# Patient Record
Sex: MALE | Race: WHITE | NOT HISPANIC OR LATINO | Employment: FULL TIME | URBAN - METROPOLITAN AREA
[De-identification: names, ages, dates, MRNs, and addresses within clinical notes are randomized per-mention and may not be internally consistent; named-entity substitution may affect disease eponyms.]

---

## 2017-11-13 ENCOUNTER — OFFICE VISIT (OUTPATIENT)
Dept: FAMILY MEDICINE | Facility: CLINIC | Age: 27
End: 2017-11-13
Payer: COMMERCIAL

## 2017-11-13 VITALS
RESPIRATION RATE: 16 BRPM | BODY MASS INDEX: 36.7 KG/M2 | SYSTOLIC BLOOD PRESSURE: 133 MMHG | OXYGEN SATURATION: 97 % | HEART RATE: 101 BPM | TEMPERATURE: 98.4 F | WEIGHT: 282 LBS | DIASTOLIC BLOOD PRESSURE: 88 MMHG

## 2017-11-13 DIAGNOSIS — R07.0 THROAT PAIN: Primary | ICD-10-CM

## 2017-11-13 LAB
DEPRECATED S PYO AG THROAT QL EIA: NORMAL
SPECIMEN SOURCE: NORMAL

## 2017-11-13 PROCEDURE — 87081 CULTURE SCREEN ONLY: CPT | Performed by: NURSE PRACTITIONER

## 2017-11-13 PROCEDURE — 87880 STREP A ASSAY W/OPTIC: CPT | Performed by: NURSE PRACTITIONER

## 2017-11-13 PROCEDURE — 99213 OFFICE O/P EST LOW 20 MIN: CPT | Performed by: NURSE PRACTITIONER

## 2017-11-13 NOTE — NURSING NOTE
"Chief Complaint   Patient presents with     Pharyngitis     sore throat and cough x 3 days       Initial /88  Pulse 101  Temp 98.4  F (36.9  C) (Oral)  Resp 16  Wt 282 lb (127.9 kg)  SpO2 97%  BMI 36.7 kg/m2 Estimated body mass index is 36.7 kg/(m^2) as calculated from the following:    Height as of 11/26/14: 6' 1.5\" (1.867 m).    Weight as of this encounter: 282 lb (127.9 kg).  Medication Reconciliation: complete   Falguni Grewal MA      "

## 2017-11-13 NOTE — PROGRESS NOTES
SUBJECTIVE:   Lalo Hill is a 27 year old male presenting with a chief complaint of cough, congestion, runny nose, throat.   Onset of symptoms was 3 day(s) ago.  Course of illness is worsening.    Severity moderate  Treatment measures tried include Tylenol/Ibuprofen, Fluids and Rest.  Predisposing factors include None.    Past Medical History:   Diagnosis Date     * * * SBE PROPHYLAXIS * * *     Bicuspid aortic valve/needs prophylaxis for dental procedures     Current Outpatient Prescriptions   Medication Sig Dispense Refill     naproxen (NAPROSYN) 500 MG tablet Take 1 tablet (500 mg) by mouth 2 times daily (with meals) 60 tablet 1     NO ACTIVE MEDICATIONS .       Social History   Substance Use Topics     Smoking status: Never Smoker     Smokeless tobacco: Not on file     Alcohol use No       ROS:  CONSTITUTIONAL:NEGATIVE for fever, chills, change in weight  INTEGUMENTARY/SKIN: NEGATIVE for worrisome rashes, moles or lesions  EYES: NEGATIVE for vision changes or irritation  ENT/MOUTH: POSITIVE for rhinorrhea-clear and sore throat  RESP: POSITIVE for cough  CV: NEGATIVE for chest pain, palpitations or peripheral edema  GI: NEGATIVE for nausea, abdominal pain, heartburn, or change in bowel habits  MUSCULOSKELETAL: NEGATIVE for significant arthralgias or myalgia  NEURO: NEGATIVE for weakness, dizziness or paresthesias  ENDOCRINE: NEGATIVE for temperature intolerance, skin/hair changes  HEME/ALLERGY/IMMUNE: NEGATIVE for bleeding problems  PSYCHIATRIC: NEGATIVE for changes in mood or affect    OBJECTIVE:  /88  Pulse 101  Temp 98.4  F (36.9  C) (Oral)  Resp 16  Wt 282 lb (127.9 kg)  SpO2 97%  BMI 36.7 kg/m2  GENERAL APPEARANCE: Alert and no distress  EYES: EOMI,  PERRL, conjunctiva clear  HENT: TM's normal bilaterally, nose and mouth without erythema, ulcers or lesions, tonsillar hypertrophy, tonsillar erythema and tonsillar exudate  NECK: supple, nontender, no lymphadenopathy  RESP: lungs clear to  auscultation - no rales, rhonchi or wheezes  CV: regular rates and rhythm, normal S1 S2, no murmur noted  SKIN: no suspicious lesions or rashes    Rapid strep negative, culture pending will notify    ASSESSMENT:  (R07.0) Throat pain  (primary encounter diagnosis)  Probable viral.     PLAN:  Ibuprofen, Saline gargles and Vaporizer, increased rest and fluids  Monitor symptoms, call or rtc if worsening or not improving.       OSMANY Kirkland CNP

## 2017-11-14 LAB
BACTERIA SPEC CULT: NORMAL
SPECIMEN SOURCE: NORMAL

## 2017-11-14 NOTE — PROGRESS NOTES
"  SUBJECTIVE:   CC: Lalo Hill is an 27 year old male who presents for preventative health visit.     Healthy Habits:    Do you get at least three servings of calcium containing foods daily (dairy, green leafy vegetables, etc.)? {YES/NO, DAIRY INTAKE:400346::\"yes\"}    Amount of exercise or daily activities, outside of work: {AMOUNT EXERCISE:603466}    Problems taking medications regularly {Yes /No default:862697::\"No\"}    Medication side effects: {Yes /No default.:165063::\"No\"}    Have you had an eye exam in the past two years? {YESNOBLANK:087512}    Do you see a dentist twice per year? {YESNOBLANK:824688}    Do you have sleep apnea, excessive snoring or daytime drowsiness?{YESNOBLANK:060490}    {Outside tests to abstract? :395562}    {additional problems to add (Optional):268316}    Today's PHQ-2 Score:   PHQ-2 ( 1999 Pfizer) 11/13/2017 5/25/2016   Q1: Little interest or pleasure in doing things 0 0   Q2: Feeling down, depressed or hopeless 0 -   PHQ-2 Score 0 -     {PHQ-2 LOOK IN ASSESSMENTS :361683}  Abuse: Current or Past(Physical, Sexual or Emotional)- {YES/NO/NA:936733}  Do you feel safe in your environment - {YES/NO/NA:273296}    Social History   Substance Use Topics     Smoking status: Never Smoker     Smokeless tobacco: Not on file     Alcohol use No     {ETOH AUDIT:110154}    Last PSA: No results found for: PSA    Reviewed orders with patient. Reviewed health maintenance and updated orders accordingly - {Yes/No:774634::\"Yes\"}  {Chronicprobdata (Optional):101439}    Reviewed and updated as needed this visit by clinical staff         Reviewed and updated as needed this visit by Provider        {HISTORY OPTIONS (Optional):438851}    ROS:  {MALE ROS-adult preventive care package:137973::\"C: NEGATIVE for fever, chills, change in weight\",\"I: NEGATIVE for worrisome rashes, moles or lesions\",\"E: NEGATIVE for vision changes or irritation\",\"ENT: NEGATIVE for ear, mouth and throat problems\",\"R: NEGATIVE for " "significant cough or SOB\",\"CV: NEGATIVE for chest pain, palpitations or peripheral edema\",\"GI: NEGATIVE for nausea, abdominal pain, heartburn, or change in bowel habits\",\" male: negative for dysuria, hematuria, decreased urinary stream, erectile dysfunction, urethral discharge\",\"M: NEGATIVE for significant arthralgias or myalgia\",\"N: NEGATIVE for weakness, dizziness or paresthesias\",\"P: NEGATIVE for changes in mood or affect\"}    OBJECTIVE:   There were no vitals taken for this visit.  EXAM:  {Exam Choices:727551}    ASSESSMENT/PLAN:   {Diag Picklist:443048}    COUNSELING:  {MALE COUNSELING MESSAGES:761595::\"Reviewed preventive health counseling, as reflected in patient instructions\"}    {BP Counseling- Complete if BP >= 120/80  (Optional):010875}     reports that he has never smoked. He does not have any smokeless tobacco history on file.  {Tobacco Cessation -- Complete if patient is a smoker (Optional):666248}  Estimated body mass index is 36.7 kg/(m^2) as calculated from the following:    Height as of 11/26/14: 6' 1.5\" (1.867 m).    Weight as of 11/13/17: 282 lb (127.9 kg).   {Weight Management Plan (ACO) Complete if BMI is abnormal-  Ages 18-64  BMI >24.9.  Age 65+ with BMI <23 or >30 (Optional):943601}    Counseling Resources:  ATP IV Guidelines  Pooled Cohorts Equation Calculator  FRAX Risk Assessment  ICSI Preventive Guidelines  Dietary Guidelines for Americans, 2010  USDA's MyPlate  ASA Prophylaxis  Lung CA Screening    Aaron Ramos PA-C  [facility]  "

## 2017-11-15 ENCOUNTER — OFFICE VISIT (OUTPATIENT)
Dept: FAMILY MEDICINE | Facility: CLINIC | Age: 27
End: 2017-11-15
Payer: COMMERCIAL

## 2017-11-15 VITALS
HEART RATE: 93 BPM | OXYGEN SATURATION: 96 % | BODY MASS INDEX: 35.94 KG/M2 | WEIGHT: 280 LBS | SYSTOLIC BLOOD PRESSURE: 138 MMHG | DIASTOLIC BLOOD PRESSURE: 85 MMHG | HEIGHT: 74 IN

## 2017-11-15 DIAGNOSIS — R03.0 ELEVATED BLOOD PRESSURE READING WITHOUT DIAGNOSIS OF HYPERTENSION: ICD-10-CM

## 2017-11-15 DIAGNOSIS — R51.9 NONINTRACTABLE HEADACHE, UNSPECIFIED CHRONICITY PATTERN, UNSPECIFIED HEADACHE TYPE: ICD-10-CM

## 2017-11-15 DIAGNOSIS — N50.89 NODULE OF TESTIS: ICD-10-CM

## 2017-11-15 DIAGNOSIS — Z23 NEED FOR PROPHYLACTIC VACCINATION AND INOCULATION AGAINST INFLUENZA: ICD-10-CM

## 2017-11-15 DIAGNOSIS — Q23.81 BICUSPID AORTIC VALVE: ICD-10-CM

## 2017-11-15 DIAGNOSIS — Z00.00 ROUTINE GENERAL MEDICAL EXAMINATION AT A HEALTH CARE FACILITY: Primary | ICD-10-CM

## 2017-11-15 LAB
ANION GAP SERPL CALCULATED.3IONS-SCNC: 8 MMOL/L (ref 3–14)
BUN SERPL-MCNC: 10 MG/DL (ref 7–30)
CALCIUM SERPL-MCNC: 9 MG/DL (ref 8.5–10.1)
CHLORIDE SERPL-SCNC: 106 MMOL/L (ref 94–109)
CHOLEST SERPL-MCNC: 165 MG/DL
CO2 SERPL-SCNC: 25 MMOL/L (ref 20–32)
CREAT SERPL-MCNC: 0.89 MG/DL (ref 0.66–1.25)
GFR SERPL CREATININE-BSD FRML MDRD: >90 ML/MIN/1.7M2
GLUCOSE SERPL-MCNC: 92 MG/DL (ref 70–99)
HDLC SERPL-MCNC: 46 MG/DL
LDLC SERPL CALC-MCNC: 101 MG/DL
NONHDLC SERPL-MCNC: 119 MG/DL
POTASSIUM SERPL-SCNC: 3.8 MMOL/L (ref 3.4–5.3)
SODIUM SERPL-SCNC: 139 MMOL/L (ref 133–144)
TRIGL SERPL-MCNC: 88 MG/DL

## 2017-11-15 PROCEDURE — 99213 OFFICE O/P EST LOW 20 MIN: CPT | Mod: 25 | Performed by: PHYSICIAN ASSISTANT

## 2017-11-15 PROCEDURE — 90471 IMMUNIZATION ADMIN: CPT | Performed by: PHYSICIAN ASSISTANT

## 2017-11-15 PROCEDURE — 99395 PREV VISIT EST AGE 18-39: CPT | Mod: 25 | Performed by: PHYSICIAN ASSISTANT

## 2017-11-15 PROCEDURE — 80061 LIPID PANEL: CPT | Performed by: PHYSICIAN ASSISTANT

## 2017-11-15 PROCEDURE — 90686 IIV4 VACC NO PRSV 0.5 ML IM: CPT | Performed by: PHYSICIAN ASSISTANT

## 2017-11-15 PROCEDURE — 36415 COLL VENOUS BLD VENIPUNCTURE: CPT | Performed by: PHYSICIAN ASSISTANT

## 2017-11-15 PROCEDURE — 80048 BASIC METABOLIC PNL TOTAL CA: CPT | Performed by: PHYSICIAN ASSISTANT

## 2017-11-15 NOTE — PROGRESS NOTES

## 2017-11-15 NOTE — PROGRESS NOTES
SUBJECTIVE:   CC: Lalo Hill is an 27 year old male who presents for preventative health visit.     Physical   Annual:     Getting at least 3 servings of Calcium per day::  Yes    Bi-annual eye exam::  Yes    Dental care twice a year::  Yes    Sleep apnea or symptoms of sleep apnea::  None    Diet::  Regular (no restrictions)    Frequency of exercise::  2-3 days/week    Duration of exercise::  15-30 minutes    Taking medications regularly::  Yes    Medication side effects::  None    Additional concerns today::  YES      Lump left testicle x 2-3 months, sensitive to the touch     History of bicuspid aortic valve. When he was younger was having chest pain and was discovered to have a bicuspid aortic valve. No current symptoms.   Last cardiac follow up  Was 19 and was to follow up age 26.     headache in the past. Tx: naproxen.   Did get them every week. And now improved in the last couple months.        Today's PHQ-2 Score:   PHQ-2 ( 1999 Pfizer) 11/15/2017   Q1: Little interest or pleasure in doing things 0   Q2: Feeling down, depressed or hopeless 0   PHQ-2 Score 0   Q1: Little interest or pleasure in doing things Not at all   Q2: Feeling down, depressed or hopeless Not at all   PHQ-2 Score 0       Abuse: Current or Past(Physical, Sexual or Emotional)- No  Do you feel safe in your environment - Yes    Social History   Substance Use Topics     Smoking status: Never Smoker     Smokeless tobacco: Never Used     Alcohol use 1.2 oz/week     2 Cans of beer per week     The patient does not drink >3 drinks per day nor >7 drinks per week.    Last PSA: No results found for: PSA    Reviewed orders with patient. Reviewed health maintenance and updated orders accordingly - Yes  Labs reviewed in EPIC  BP Readings from Last 3 Encounters:   11/15/17 138/85   11/13/17 133/88   05/25/16 124/76    Wt Readings from Last 3 Encounters:   11/15/17 280 lb (127 kg)   11/13/17 282 lb (127.9 kg)   05/25/16 278 lb (126.1 kg)                   Patient Active Problem List   Diagnosis     CARDIOVASCULAR SCREENING; LDL GOAL LESS THAN 160     Drusen (degenerative) of retina     Elevated blood pressure reading without diagnosis of hypertension     Bicuspid aortic valve     Nonintractable headache, unspecified chronicity pattern, unspecified headache type     BMI 36.0-36.9,adult     History reviewed. No pertinent surgical history.    Social History   Substance Use Topics     Smoking status: Never Smoker     Smokeless tobacco: Never Used     Alcohol use 1.2 oz/week     2 Cans of beer per week     Family History   Problem Relation Age of Onset     Hypertension Father      CANCER Maternal Grandmother      C.A.D. Maternal Grandfather      CEREBROVASCULAR DISEASE Maternal Grandfather      Prostate Cancer Maternal Grandfather      C.A.D. Paternal Grandmother      Blood Disease Paternal Grandmother      C.A.D. Paternal Grandfather      CEREBROVASCULAR DISEASE Paternal Grandfather      Hypertension Paternal Grandfather      Prostate Cancer Paternal Grandfather          Current Outpatient Prescriptions   Medication Sig Dispense Refill     NO ACTIVE MEDICATIONS .       naproxen (NAPROSYN) 500 MG tablet Take 1 tablet (500 mg) by mouth 2 times daily (with meals) (Patient not taking: Reported on 11/15/2017) 60 tablet 1     Allergies   Allergen Reactions     Erythro [Erythromycin] Rash           Reviewed and updated as needed this visit by clinical staffTobacco  Allergies  Meds  Problems  Med Hx  Surg Hx  Fam Hx  Soc Hx          Reviewed and updated as needed this visit by Provider  Allergies  Meds  Problems          Past Medical History:   Diagnosis Date     * * * SBE PROPHYLAXIS * * *     Bicuspid aortic valve/needs prophylaxis for dental procedures      History reviewed. No pertinent surgical history.    Review of Systems  C: NEGATIVE for fever, chills, change in weight  I: NEGATIVE for worrisome rashes, moles or lesions  E: NEGATIVE for vision changes or  "irritation  ENT: NEGATIVE for ear, mouth and throat problems  R: NEGATIVE for significant cough or SOB  CV: NEGATIVE for chest pain, palpitations or peripheral edema  GI: NEGATIVE for nausea, abdominal pain, heartburn, or change in bowel habits   male: negative for dysuria, hematuria, decreased urinary stream, erectile dysfunction, urethral discharge  M: NEGATIVE for significant arthralgias or myalgia  N: NEGATIVE for weakness, dizziness or paresthesias  P: NEGATIVE for changes in mood or affect    OBJECTIVE:   /85  Pulse 93  Ht 6' 1.5\" (1.867 m)  Wt 280 lb (127 kg)  SpO2 96%  BMI 36.44 kg/m2    Physical Exam  GENERAL: healthy, alert and no distress  EYES: Eyes grossly normal to inspection, PERRL and conjunctivae and sclerae normal  HENT: ear canals and TM's normal, nose and mouth without ulcers or lesions  NECK: no adenopathy, no asymmetry, masses, or scars and thyroid normal to palpation  RESP: lungs clear to auscultation - no rales, rhonchi or wheezes  CV: regular rate and rhythm, normal S1 S2, no S3 or S4, no murmur, click or rub, no peripheral edema and peripheral pulses strong  ABDOMEN: soft, nontender, no hepatosplenomegaly, no masses and bowel sounds normal   (male): normal male genitalia without lesions or urethral discharge, no hernia  Small epididymal nodule on left.   MS: no gross musculoskeletal defects noted, no edema  SKIN: no suspicious lesions or rashes  NEURO: Normal strength and tone, mentation intact and speech normal  PSYCH: mentation appears normal, affect normal/bright    ASSESSMENT/PLAN:       ICD-10-CM    1. Routine general medical examination at a health care facility Z00.00 Lipid panel reflex to direct LDL Fasting     Basic metabolic panel   2. Bicuspid aortic valve Q23.1 CARDIOLOGY EVAL ADULT REFERRAL     OFFICE/OUTPT VISIT,EST,LEVL III   3. Nonintractable headache, unspecified chronicity pattern, unspecified headache type R51 OFFICE/OUTPT VISIT,EST,LEVL III   4. Elevated " "blood pressure reading without diagnosis of hypertension R03.0 OFFICE/OUTPT VISIT,EST,LEVL III   5. BMI 36.0-36.9,adult Z68.36    6. Nodule of testis N50.89 US Testicular and Scrotum     OFFICE/OUTPT VISIT,EST,LEVL III   7. Need for prophylactic vaccination and inoculation against influenza Z23 FLU VAC, SPLIT VIRUS IM > 3 YO (QUADRIVALENT) [99660]     Vaccine Administration, Initial [55104]   1. Work on Healthy diet and exercise. Getting heart rate elevated for 30 mins most days of week.  2. Follow up  With cardiology  3. Stable con't naproxen as needed  4-5. Work on Healthy diet and exercise. Getting heart rate elevated for 30 mins most days of week.  6. Will get [place] Follow up  Dependant on results.     COUNSELING:   Reviewed preventive health counseling, as reflected in patient instructions       Regular exercise       Healthy diet/nutrition    BP Screening:   Last 3 BP Readings:    BP Readings from Last 3 Encounters:   11/15/17 138/85   11/13/17 133/88   05/25/16 124/76       The following was recommended to the patient:  Re-screen BP within a year and recommended lifestyle modifications       reports that he has never smoked. He has never used smokeless tobacco.      Estimated body mass index is 36.44 kg/(m^2) as calculated from the following:    Height as of this encounter: 6' 1.5\" (1.867 m).    Weight as of this encounter: 280 lb (127 kg).   Weight management plan: Discussed healthy diet and exercise guidelines and patient will follow up in 12 months in clinic to re-evaluate.    Counseling Resources:  ATP IV Guidelines  Pooled Cohorts Equation Calculator  FRAX Risk Assessment  ICSI Preventive Guidelines  Dietary Guidelines for Americans, 2010  USDA's MyPlate  ASA Prophylaxis  Lung CA Screening    Aaron Ramos PA-C  [facility]  "

## 2017-11-15 NOTE — LETTER
November 15, 2017    Lalo Hill  [address] [place] [address]          Mr. Hill,    All of your labs were normal for you.    Please contact the clinic if you have additional questions.  Thank you.    Sincerely,    Aaron Ramos PA-C     Results for orders placed or performed in visit on 11/15/17   Lipid panel reflex to direct LDL Fasting   Result Value Ref Range    Cholesterol 165 <200 mg/dL    Triglycerides 88 <150 mg/dL    HDL Cholesterol 46 >39 mg/dL    LDL Cholesterol Calculated 101 (H) <100 mg/dL    Non HDL Cholesterol 119 <130 mg/dL   Basic metabolic panel   Result Value Ref Range    Sodium 139 133 - 144 mmol/L    Potassium 3.8 3.4 - 5.3 mmol/L    Chloride 106 94 - 109 mmol/L    Carbon Dioxide 25 20 - 32 mmol/L    Anion Gap 8 3 - 14 mmol/L    Glucose 92 70 - 99 mg/dL    Urea Nitrogen 10 7 - 30 mg/dL    Creatinine 0.89 0.66 - 1.25 mg/dL    GFR Estimate >90 >60 mL/min/1.7m2    GFR Estimate If Black >90 >60 mL/min/1.7m2    Calcium 9.0 8.5 - 10.1 mg/dL

## 2017-11-15 NOTE — MR AVS SNAPSHOT
After Visit Summary   11/15/2017    Lalo Hill    MRN: [id number]           Patient Information     Date Of Birth          1990        Visit Information        Provider Department      11/15/2017 8:00 AM Aaron Ramos PA-C [facility]        Today's Diagnoses     Routine general medical examination at a health care facility    -  1    Bicuspid aortic valve        Nonintractable headache, unspecified chronicity pattern, unspecified headache type        Elevated blood pressure reading without diagnosis of hypertension        BMI 36.0-36.9,adult        Nodule of testis          Care Instructions      Preventive Health Recommendations  Male Ages 26 - 39    Yearly exam:             See your health care provider every year in order to  o   Review health changes.   o   Discuss preventive care.    o   Review your medicines if your doctor has prescribed any.    You should be tested each year for STDs (sexually transmitted diseases), if you re at risk.     After age 35, talk to your provider about cholesterol testing. If you are at risk for heart disease, have your cholesterol tested at least every 5 years.     If you are at risk for diabetes, you should have a diabetes test (fasting glucose).  Shots: Get a flu shot each year. Get a tetanus shot every 10 years.     Nutrition:    Eat at least 5 servings of fruits and vegetables daily.     Eat whole-grain bread, whole-wheat pasta and brown rice instead of white grains and rice.     Talk to your provider about Calcium and Vitamin D.     Lifestyle    Exercise for at least 150 minutes a week (30 minutes a day, 5 days a week). This will help you control your weight and prevent disease.     Limit alcohol to one drink per day.     No smoking.     Wear sunscreen to prevent skin cancer.     See your dentist every six months for an exam and cleaning.             Follow-ups after your visit        Future tests that were ordered for you today   "   Open Future Orders        Priority Expected Expires Ordered    US Testicular and Scrotum Routine  11/15/2018 11/15/2017            Who to contact     If you have questions or need follow up information about today's clinic visit or your schedule please contact [facility] directly at [phone].  Normal or non-critical lab and imaging results will be communicated to you by MyChart, letter or phone within 4 business days after the clinic has received the results. If you do not hear from us within 7 days, please contact the clinic through MyChart or phone. If you have a critical or abnormal lab result, we will notify you by phone as soon as possible.  Submit refill requests through [employer] or call your pharmacy and they will forward the refill request to us. Please allow 3 business days for your refill to be completed.          Additional Information About Your Visit        MyChart Information     [employer] lets you send messages to your doctor, view your test results, renew your prescriptions, schedule appointments and more. To sign up, go to [email or web address] . Click on \"Log in\" on the left side of the screen, which will take you to the Welcome page. Then click on \"Sign up Now\" on the right side of the page.     You will be asked to enter the access code listed below, as well as some personal information. Please follow the directions to create your username and password.     Your access code is: [email or web address]  Expires: 2018  1:42 PM     Your access code will  in 90 days. If you need help or a new code, please call your [facility] clinic or [phone].        Care EveryWhere ID     This is your Care EveryWhere ID. This could be used by other organizations to access your [facility] medical records  [id number]        Your Vitals Were     Pulse Height Pulse Oximetry BMI (Body Mass Index)          93 6' 1.5\" (1.867 m) 96% 36.44 kg/m2         Blood Pressure from Last 3 Encounters: "   11/15/17 138/85   11/13/17 133/88   05/25/16 124/76    Weight from Last 3 Encounters:   11/15/17 280 lb (127 kg)   11/13/17 282 lb (127.9 kg)   05/25/16 278 lb (126.1 kg)              We Performed the Following     Basic metabolic panel     Lipid panel reflex to direct LDL Fasting        Primary Care Provider Office Phone # Fax #    [facility] [phone]       No address on file        Equal Access to Services     EDUIN DOMÍNGUEZ : Hadii aad ku hadasho Soomaali, waaxda luqadaha, qaybta kaalmada adeegyada, salo vital . So [facility] [phone].    ATENCIÓN: Si habla español, tiene a shannon disposición servicios gratuitos de asistencia lingüística. Llame al [phone].    We comply with applicable federal civil rights laws and [place] laws. We do not discriminate on the basis of race, color, national origin, age, disability, sex, sexual orientation, or gender identity.            Thank you!     Thank you for choosing [facility]  for your care. Our goal is always to provide you with excellent care. Hearing back from our patients is one way we can continue to improve our services. Please take a few minutes to complete the written survey that you may receive in the mail after your visit with us. Thank you!             Your Updated Medication List - Protect others around you: Learn how to safely use, store and throw away your medicines at www.disposemymeds.org.          This list is accurate as of: 11/15/17  8:30 AM.  Always use your most recent med list.                   Brand Name Dispense Instructions for use Diagnosis    naproxen 500 MG tablet    NAPROSYN    60 tablet    Take 1 tablet (500 mg) by mouth 2 times daily (with meals)    Headache(784.0)       NO ACTIVE MEDICATIONS      .

## 2017-11-15 NOTE — NURSING NOTE
"Chief Complaint   Patient presents with     Physical       Initial /85  Pulse 93  Ht 6' 1.5\" (1.867 m)  Wt 280 lb (127 kg)  SpO2 96%  BMI 36.44 kg/m2 Estimated body mass index is 36.44 kg/(m^2) as calculated from the following:    Height as of this encounter: 6' 1.5\" (1.867 m).    Weight as of this encounter: 280 lb (127 kg).  Medication Reconciliation: complete  Yue Mathis CMA    "

## 2017-11-15 NOTE — PROGRESS NOTES
MrFela Hill,    All of your labs were normal for you.    Please contact the clinic if you have additional questions.  Thank you.    Sincerely,    Aaron Ramos PA-C

## 2017-11-16 ENCOUNTER — TELEPHONE (OUTPATIENT)
Dept: FAMILY MEDICINE | Facility: CLINIC | Age: 27
End: 2017-11-16

## 2017-11-16 ENCOUNTER — RADIANT APPOINTMENT (OUTPATIENT)
Dept: ULTRASOUND IMAGING | Facility: CLINIC | Age: 27
End: 2017-11-16
Attending: PHYSICIAN ASSISTANT
Payer: COMMERCIAL

## 2017-11-16 DIAGNOSIS — N50.89 NODULE OF TESTIS: ICD-10-CM

## 2017-11-16 PROCEDURE — 76870 US EXAM SCROTUM: CPT

## 2017-11-16 NOTE — TELEPHONE ENCOUNTER
Left message on voicemail for patient to call back.   Direct number given to call back: [phone].     Carmen Valentin RN

## 2017-11-16 NOTE — TELEPHONE ENCOUNTER
Notes Recorded by Aaron Ramos PA-C on 11/16/2017 at 4:23 PM  RN please call patient with normal results.

## 2019-04-22 ENCOUNTER — TRANSFERRED RECORDS (OUTPATIENT)
Dept: HEALTH INFORMATION MANAGEMENT | Facility: CLINIC | Age: 29
End: 2019-04-22

## 2019-04-22 ENCOUNTER — NURSE TRIAGE (OUTPATIENT)
Dept: NURSING | Facility: CLINIC | Age: 29
End: 2019-04-22

## 2019-04-22 NOTE — TELEPHONE ENCOUNTER
"    Reason for Disposition    [1] Pain in the scrotum or testicle AND [2] present > 1 hour    Additional Information    Negative: Shock suspected (e.g., cold/pale/clammy skin, too weak to stand, low BP, rapid pulse)    Negative: Difficult to awaken or acting confused  (e.g., disoriented, slurred speech)    Negative: Passed out (i.e., lost consciousness, collapsed and was not responding)    Negative: Sounds like a life-threatening emergency to the triager    Negative: Chest pain    Negative: Pain is mainly in upper abdomen  (if needed ask: \"is it mainly above the belly button?\")    Negative: Followed an abdomen (stomach) injury    Negative: [1] SEVERE pain (e.g., excruciating) AND [2] present > 1 hour     Pain at 5    Negative: [1] SEVERE pain AND [2] age > 60    Negative: [1] Vomiting AND [2] contains red blood or black (\"coffee ground\") material  (Exception: few red streaks in vomit that only happened once)    Negative: Blood in bowel movements  (Exception: Blood on surface of BM with constipation)    Negative: Black or tarry bowel movements  (Exception: chronic-unchanged  black-grey bowel movements AND is taking iron pills or Pepto-bismol)    Negative: [1] Unable to urinate (or only a few drops) > 4 hours AND     [2] bladder feels very full (e.g., palpable bladder or strong urge to urinate)    Protocols used: ABDOMINAL PAIN - MALE-ADULT-      "

## 2021-12-15 ENCOUNTER — E-VISIT (OUTPATIENT)
Dept: URGENT CARE | Facility: CLINIC | Age: 31
End: 2021-12-15
Payer: COMMERCIAL

## 2021-12-15 DIAGNOSIS — J01.90 ACUTE SINUSITIS, RECURRENCE NOT SPECIFIED, UNSPECIFIED LOCATION: ICD-10-CM

## 2021-12-15 DIAGNOSIS — Z20.822 SUSPECTED COVID-19 VIRUS INFECTION: ICD-10-CM

## 2021-12-15 PROCEDURE — 99421 OL DIG E/M SVC 5-10 MIN: CPT | Performed by: PHYSICIAN ASSISTANT

## 2021-12-15 RX ORDER — GUAIFENESIN 1200 MG/1
1200 TABLET, EXTENDED RELEASE ORAL 2 TIMES DAILY
Qty: 60 TABLET | Refills: 0 | Status: SHIPPED | OUTPATIENT
Start: 2021-12-15 | End: 2022-09-26

## 2021-12-15 RX ORDER — PSEUDOEPHEDRINE HCL 120 MG/1
120 TABLET, FILM COATED, EXTENDED RELEASE ORAL EVERY 12 HOURS
Qty: 15 TABLET | Refills: 0 | Status: SHIPPED | OUTPATIENT
Start: 2021-12-15 | End: 2022-09-26

## 2021-12-15 NOTE — PATIENT INSTRUCTIONS
Dear Lalo Hill,    Your symptoms show that you may have coronavirus (COVID-19). This illness can cause fever, cough and trouble breathing. Many people get a mild case and get better on their own. Some people can get very sick.  You may also just have a sinus infection. These are generally viral and should go away within 7-10 days. If symptoms are persisting or worsening follow up.     Will I be tested for COVID-19?  We would like to test you for Covid-19 virus. I have placed orders for this test.     To schedule: go to your [employer] home page and scroll down to the section that says  You have an appointment that needs to be scheduled  and click the large green button that says  Schedule Now  and follow the steps to find the next available openings.    If you are unable to complete these [employer] scheduling steps, please call [phone] to schedule your testing.     Return to work/school/ guidance:  Please let your workplace manager and staffing office know when your quarantine ends     We can t give you an exact date as it depends on the above. You can calculate this on your own or work with your manager/staffing office to calculate this. (For example if you were exposed on 10/4, you would have to quarantine for 14 full days. That would be through 10/18. You could return on 10/19.)      If you receive a positive COVID-19 test result, follow the guidance of the those who are giving you the results. Usually the return to work is 10 (or in some cases 20 days from symptom onset.) If you work at [employer], you must also be cleared by Employee Occupational Health and Safety to return to work.        If you receive a negative COVID-19 test result and did not have a high risk exposure to someone with a known positive COVID-19 test, you can return to work once you're free of fever for 24 hours without fever-reducing medication and your symptoms are improving or resolved.      If you receive a negative  COVID-19 test and If you had a high risk exposure to someone who has tested positive for COVID-19 then you can return to work 14 days after your last contact with the positive individual    Note: If you have ongoing exposure to the covid positive person, this quarantine period may be more than 14 days. (For example, if you are continued to be exposed to your child who tested positive and cannot isolate from them, then the quarantine of 7-14 days can't start until your child is no longer contagious. This is typically 10 days from onset of the child's symptoms. So the total duration may be 17-24 days in this case.)    Sign up for [employer].   We know it's scary to hear that you might have COVID-19. We want to track your symptoms to make sure you're okay over the next 2 weeks. Please look for an email from [employer]--this is a free, online program that we'll use to keep in touch. To sign up, follow the link in the email you will receive. Learn more at [email or web address]    How can I take care of myself?    Get lots of rest. Drink extra fluids (unless a doctor has told you not to)    Take Tylenol (acetaminophen) or ibuprofen for fever or pain. If you have liver or kidney problems, ask your family doctor if it's okay to take Tylenol o ibuprofen    If you have other health problems (like cancer, heart failure, an organ transplant or severe kidney disease): Call your specialty clinic if you don't feel better in the next 2 days.    Know when to call 911. Emergency warning signs include:  o Trouble breathing or shortness of breath  o Pain or pressure in the chest that doesn't go away  o Feeling confused like you haven't felt before, or not being able to wake up  o Bluish-colored lips or face    Where can I get more information?   [facility] - About COVID-19:   [email or web address]    CDC - What to Do If You're Sick:   www.cdc.gov/coronavirus/2019-ncov/about/steps-when-sick.html

## 2021-12-16 ENCOUNTER — LAB (OUTPATIENT)
Dept: URGENT CARE | Facility: URGENT CARE | Age: 31
End: 2021-12-16
Attending: PHYSICIAN ASSISTANT
Payer: COMMERCIAL

## 2021-12-16 DIAGNOSIS — Z20.822 SUSPECTED COVID-19 VIRUS INFECTION: ICD-10-CM

## 2021-12-16 PROCEDURE — U0005 INFEC AGEN DETEC AMPLI PROBE: HCPCS

## 2021-12-16 PROCEDURE — U0003 INFECTIOUS AGENT DETECTION BY NUCLEIC ACID (DNA OR RNA); SEVERE ACUTE RESPIRATORY SYNDROME CORONAVIRUS 2 (SARS-COV-2) (CORONAVIRUS DISEASE [COVID-19]), AMPLIFIED PROBE TECHNIQUE, MAKING USE OF HIGH THROUGHPUT TECHNOLOGIES AS DESCRIBED BY CMS-2020-01-R: HCPCS

## 2021-12-17 LAB — SARS-COV-2 RNA RESP QL NAA+PROBE: NEGATIVE

## 2022-01-16 ENCOUNTER — HEALTH MAINTENANCE LETTER (OUTPATIENT)
Age: 32
End: 2022-01-16

## 2022-09-26 ENCOUNTER — OFFICE VISIT (OUTPATIENT)
Dept: FAMILY MEDICINE | Facility: CLINIC | Age: 32
End: 2022-09-26
Payer: COMMERCIAL

## 2022-09-26 VITALS
BODY MASS INDEX: 40.43 KG/M2 | TEMPERATURE: 98.4 F | WEIGHT: 315 LBS | DIASTOLIC BLOOD PRESSURE: 90 MMHG | SYSTOLIC BLOOD PRESSURE: 145 MMHG | HEART RATE: 109 BPM | HEIGHT: 74 IN | OXYGEN SATURATION: 95 %

## 2022-09-26 DIAGNOSIS — Z00.00 ROUTINE GENERAL MEDICAL EXAMINATION AT A HEALTH CARE FACILITY: Primary | ICD-10-CM

## 2022-09-26 DIAGNOSIS — I47.10 SVT (SUPRAVENTRICULAR TACHYCARDIA) (H): ICD-10-CM

## 2022-09-26 DIAGNOSIS — R03.0 ELEVATED BLOOD PRESSURE READING WITHOUT DIAGNOSIS OF HYPERTENSION: ICD-10-CM

## 2022-09-26 DIAGNOSIS — Z23 HIGH PRIORITY FOR 2019-NCOV VACCINE: ICD-10-CM

## 2022-09-26 DIAGNOSIS — H61.22 IMPACTED CERUMEN OF LEFT EAR: ICD-10-CM

## 2022-09-26 DIAGNOSIS — I10 BENIGN ESSENTIAL HYPERTENSION: ICD-10-CM

## 2022-09-26 DIAGNOSIS — Z23 NEED FOR PROPHYLACTIC VACCINATION AND INOCULATION AGAINST INFLUENZA: ICD-10-CM

## 2022-09-26 PROCEDURE — 69209 REMOVE IMPACTED EAR WAX UNI: CPT | Mod: LT | Performed by: FAMILY MEDICINE

## 2022-09-26 PROCEDURE — 90686 IIV4 VACC NO PRSV 0.5 ML IM: CPT | Performed by: FAMILY MEDICINE

## 2022-09-26 PROCEDURE — 99395 PREV VISIT EST AGE 18-39: CPT | Mod: 25 | Performed by: FAMILY MEDICINE

## 2022-09-26 PROCEDURE — 90471 IMMUNIZATION ADMIN: CPT | Performed by: FAMILY MEDICINE

## 2022-09-26 PROCEDURE — 99214 OFFICE O/P EST MOD 30 MIN: CPT | Mod: 25 | Performed by: FAMILY MEDICINE

## 2022-09-26 PROCEDURE — 91313 COVID-19,PF,MODERNA BIVALENT: CPT | Performed by: FAMILY MEDICINE

## 2022-09-26 PROCEDURE — 0134A COVID-19,PF,MODERNA BIVALENT: CPT | Performed by: FAMILY MEDICINE

## 2022-09-26 RX ORDER — METOPROLOL SUCCINATE 100 MG/1
TABLET, EXTENDED RELEASE ORAL
COMMUNITY
Start: 2022-09-21

## 2022-09-26 ASSESSMENT — ENCOUNTER SYMPTOMS
NAUSEA: 0
SORE THROAT: 1
SHORTNESS OF BREATH: 0
FEVER: 0
CONSTIPATION: 0
EYE PAIN: 0
HEMATURIA: 0
JOINT SWELLING: 0
ARTHRALGIAS: 0
HEMATOCHEZIA: 0
FREQUENCY: 0
HEARTBURN: 0
MYALGIAS: 0
HEADACHES: 0
PALPITATIONS: 0
COUGH: 0
CHILLS: 0
DIZZINESS: 0
DIARRHEA: 0
WEAKNESS: 0
NERVOUS/ANXIOUS: 0
DYSURIA: 0
PARESTHESIAS: 0
ABDOMINAL PAIN: 0

## 2022-09-26 ASSESSMENT — PAIN SCALES - GENERAL: PAINLEVEL: NO PAIN (0)

## 2022-09-26 NOTE — PROGRESS NOTES
cer imp  SUBJECTIVE:   CC: Lalo is an 32 year old who presents for preventative health visit.       Patient has been advised of split billing requirements and indicates understanding: Yes  Healthy Habits:     Getting at least 3 servings of Calcium per day:  Yes    Bi-annual eye exam:  Yes    Dental care twice a year:  Yes    Sleep apnea or symptoms of sleep apnea:  Daytime drowsiness and Excessive snoring    Diet:  Regular (no restrictions)    Frequency of exercise:  2-3 days/week    Duration of exercise:  Greater than 60 minutes    Taking medications regularly:  Yes    Medication side effects:  None    PHQ-2 Total Score: 2    Additional concerns today:  Yes    0 scratch on right arm   - snoring , possible apnea , drowsy during the day worse over the past year , feels tired in the morning   -Hypertension:     Wt Readings from Last 4 Encounters:   09/26/22 (!) 163.3 kg (360 lb)   11/15/17 127 kg (280 lb)   11/13/17 127.9 kg (282 lb)   05/25/16 126.1 kg (278 lb)       Preventive -    Immunization History   Administered Date(s) Administered     COVID-19,PF,Moderna 04/06/2021, 05/04/2021, 01/26/2022     COVID-19,PF,Moderna BIVALENT Booster 09/26/2022     DTAP (<7y) 1991, 1991, 06/17/1992, 08/09/1992, 04/07/1995     FLU 6-35 months 11/11/2006, 10/27/2007, 11/20/2008     HEPA 09/15/2008, 08/03/2009     Hep B, Peds or Adolescent 08/01/1996, 09/16/1996, 04/01/1997     HepA-Adult 09/15/2008, 08/03/2009     HepA-ped 2 Dose 09/15/2008, 08/03/2009     HepB 08/01/1996, 09/16/1996, 04/01/1997     HepB-Adult 09/16/1996, 04/01/1997     Hib (PRP-T) 1991, 1991, 06/17/1992     Hib, Unspecified 1991, 1991, 06/17/1992     Influenza (IIV3) PF 10/26/2002, 12/11/2003, 12/28/2004, 11/12/2005     Influenza Vaccine IM > 6 months Valent IIV4 (Alfuria,Fluzone) 11/15/2017, 01/24/2019, 01/26/2022, 09/26/2022     MMR 02/26/1992, 08/05/1997     Meningococcal (Menactra ) 08/03/2009     Meningococcal Mcv4  Conjugate,unspecified  08/03/2009     Poliovirus, inactivated (IPV) 1991, 1991, 1991, 04/07/1995     TD (ADULT, 7+) 02/17/2003     TDAP Vaccine (Adacel) 08/24/2017   Flu and COVID booster       - Colon CA screen: Colonoscopy, age 45-75 every 10 years or FIT every year or Cologuard every 3 years   No family hx ofcolon cancer         -lipids screen: ordered     Diabetes screen: ordered         Today's PHQ-2 Score:   PHQ-2 ( 1999 Pfizer) 9/26/2022   Q1: Little interest or pleasure in doing things 1   Q2: Feeling down, depressed or hopeless 1   PHQ-2 Score 2   Q1: Little interest or pleasure in doing things Several days   Q2: Feeling down, depressed or hopeless Several days   PHQ-2 Score 2   SH:    Marital status: single   Kids: no   Employment:    Exercise: yes   Tobacco: no   Etoh: moderate   Recreational drugs: no   Caffeine: soda       Abuse: Current or Past(Physical, Sexual or Emotional)- No  Do you feel safe in your environment? Yes    Have you ever done Advance Care Planning? (For example, a Health Directive, POLST, or a discussion with a medical provider or your loved ones about your wishes): No, advance care planning information given to patient to review.  Patient plans to discuss their wishes with loved ones or provider.      Social History     Tobacco Use     Smoking status: Never Smoker     Smokeless tobacco: Never Used   Substance Use Topics     Alcohol use: Yes     Alcohol/week: 2.0 standard drinks     Types: 2 Cans of beer per week     If you drink alcohol do you typically have >3 drinks per day or >7 drinks per week? No    Alcohol Use 9/26/2022   Prescreen: >3 drinks/day or >7 drinks/week? No   Prescreen: >3 drinks/day or >7 drinks/week? -   No flowsheet data found.    Last PSA: No results found for: PSA    Reviewed orders with patient. Reviewed health maintenance and updated orders accordingly - Yes  Lab work is in process  BP Readings from Last 3 Encounters:   09/26/22 (!)  145/90   11/15/17 138/85   11/13/17 133/88    Wt Readings from Last 3 Encounters:   09/26/22 (!) 163.3 kg (360 lb)   11/15/17 127 kg (280 lb)   11/13/17 127.9 kg (282 lb)                  Patient Active Problem List   Diagnosis     CARDIOVASCULAR SCREENING; LDL GOAL LESS THAN 160     Drusen (degenerative) of retina     Elevated blood pressure reading without diagnosis of hypertension     Bicuspid aortic valve     Nonintractable headache, unspecified chronicity pattern, unspecified headache type     BMI 36.0-36.9,adult     SVT (supraventricular tachycardia) (H)     History reviewed. No pertinent surgical history.    Social History     Tobacco Use     Smoking status: Never Smoker     Smokeless tobacco: Never Used   Substance Use Topics     Alcohol use: Yes     Alcohol/week: 2.0 standard drinks     Types: 2 Cans of beer per week     Family History   Problem Relation Age of Onset     Hypertension Father      Cancer Maternal Grandmother      C.A.D. Maternal Grandfather      Cerebrovascular Disease Maternal Grandfather      Prostate Cancer Maternal Grandfather      C.A.D. Paternal Grandmother      Blood Disease Paternal Grandmother      C.A.D. Paternal Grandfather      Cerebrovascular Disease Paternal Grandfather      Hypertension Paternal Grandfather      Prostate Cancer Paternal Grandfather          Current Outpatient Medications   Medication Sig Dispense Refill     metoprolol succinate ER (TOPROL XL) 100 MG 24 hr tablet        Allergies   Allergen Reactions     Erythro [Erythromycin] Rash     Erythromycin Rash     Patient unsure - reaction occurred in a child     Recent Labs   Lab Test 11/15/17  0836   *   HDL 46   TRIG 88   CR 0.89   GFRESTIMATED >90   GFRESTBLACK >90   POTASSIUM 3.8        Reviewed and updated as needed this visit by clinical staff   Tobacco  Allergies  Meds    Surg Hx  Fam Hx  Soc Hx          Reviewed and updated as needed this visit by Provider   Tobacco      Surg Hx  Fam Hx          "  Past Medical History:   Diagnosis Date     * * * SBE PROPHYLAXIS * * *     Bicuspid aortic valve/needs prophylaxis for dental procedures     Hypertension      SVT (supraventricular tachycardia) (H)      SVT (supraventricular tachycardia) (H) 9/26/2022      History reviewed. No pertinent surgical history.    Review of Systems   Constitutional: Negative for chills and fever.   HENT: Positive for ear pain and sore throat. Negative for congestion and hearing loss.    Eyes: Negative for pain and visual disturbance.   Respiratory: Negative for cough and shortness of breath.    Cardiovascular: Negative for chest pain, palpitations and peripheral edema.   Gastrointestinal: Negative for abdominal pain, constipation, diarrhea, heartburn, hematochezia and nausea.   Genitourinary: Negative for dysuria, frequency, genital sores, hematuria, impotence, penile discharge and urgency.   Musculoskeletal: Negative for arthralgias, joint swelling and myalgias.   Skin: Negative for rash.   Neurological: Negative for dizziness, weakness, headaches and paresthesias.   Psychiatric/Behavioral: Negative for mood changes. The patient is not nervous/anxious.          OBJECTIVE:   BP (!) 145/90   Pulse 109   Temp 98.4  F (36.9  C) (Tympanic)   Ht 1.867 m (6' 1.5\")   Wt (!) 163.3 kg (360 lb)   SpO2 95%   BMI 46.85 kg/m      Physical Exam  Vitals and nursing note reviewed.   Constitutional:       General: He is not in acute distress.     Appearance: Normal appearance. He is obese. He is not ill-appearing, toxic-appearing or diaphoretic.   HENT:      Head: Normocephalic and atraumatic.      Right Ear: Tympanic membrane normal. There is no impacted cerumen.      Left Ear: There is impacted cerumen.      Nose: Nose normal. No congestion or rhinorrhea.   Cardiovascular:      Rate and Rhythm: Normal rate and regular rhythm.      Pulses: Normal pulses.      Heart sounds: Normal heart sounds. No murmur heard.    No friction rub. No gallop. "   Pulmonary:      Effort: Pulmonary effort is normal. No respiratory distress.      Breath sounds: Normal breath sounds. No stridor. No wheezing, rhonchi or rales.   Chest:      Chest wall: No tenderness.   Skin:     Capillary Refill: Capillary refill takes less than 2 seconds.               ASSESSMENT/PLAN:   (Z00.00) Routine general medical examination at a health care facility  (primary encounter diagnosis)  Comment: Preventive care reviewed and updated.    Plan: Lipid panel reflex to direct LDL Fasting,         Hemoglobin A1c     -We discussed recommendation of 150 min moderate intensity exercise /week   - We discussed the need for heart healthy diet including a diet rich in fruits, vegetables and fiber and very low on carbonated beverages sugar  - We discussed recommendation of moderation of alcohol, salt and NSAIDs.      (R03.0) Elevated blood pressure reading without diagnosis of hypertension  Comment:elevated BP   Plan: Comprehensive metabolic panel (BMP + Alb, Alk         Phos, ALT, AST, Total. Bili, TP), 24 Hour Blood        Pressure Monitor - Adult, Aldosterone, Renin         activity, Aldosterone Renin Ratio     The patient was advised to do the following therapuetic life style changes  - Dietary sodium restriction and increase potassium and Calcium intake  - Regular aerobic exercise  - Weight loss  - Discontinue smoking if applicable  - Avoid regular NSAID use if applicable  - Avoid regular decongestant use if applicable    If 24 BP monitor shows hypertensin will start Lisinopril - hydrochlorothiazide   (Z23) High priority for 2019-nCoV vaccine    Plan: COVID-19,PF,MODERNA BIVALENT 18+Yrs    (Z23) Need for prophylactic vaccination and inoculation against influenza    Plan: INFLUENZA VACCINE IM > 6 MONTHS VALENT IIV4         (AFLURIA/FLUZONE)          (H61.22) Impacted cerumen of left ear  Comment:   Patient is here for concern of hearing loss secondary to cerumen impaction.  He would like left ear  "cleaned   He feels left ear are plugged.  Patient denies any other symptoms including fever, chills, dizziness, tinnitus.  Exam showed left ear impacted cerumen  Left ear wash was performed by MA.  Patient tolerated procedure well no immediate complications.  He felt improvement after removing the wax.     (I47.1) SVT (supraventricular tachycardia) (H)  Comment: stable , no current symptoms   Plan: follows with cardiology  continue Metoprolol     Patient has been advised of split billing requirements and indicates understanding: Yes    COUNSELING:   Reviewed preventive health counseling, as reflected in patient instructions       Regular exercise       Healthy diet/nutrition       Immunizations    Vaccinated for: Influenza          Estimated body mass index is 46.85 kg/m  as calculated from the following:    Height as of this encounter: 1.867 m (6' 1.5\").    Weight as of this encounter: 163.3 kg (360 lb).     Weight management plan: Discussed healthy diet and exercise guidelines    He reports that he has never smoked. He has never used smokeless tobacco.      Counseling Resources:  ATP IV Guidelines  Pooled Cohorts Equation Calculator  FRAX Risk Assessment  ICSI Preventive Guidelines  Dietary Guidelines for Americans, 2010  USDA's MyPlate  ASA Prophylaxis  Lung CA Screening    Eliza Uriarte MD  [facility]  "

## 2022-09-27 ENCOUNTER — ANCILLARY PROCEDURE (OUTPATIENT)
Dept: CARDIOLOGY | Facility: CLINIC | Age: 32
End: 2022-09-27
Attending: FAMILY MEDICINE
Payer: COMMERCIAL

## 2022-09-27 DIAGNOSIS — R03.0 ELEVATED BLOOD PRESSURE READING WITHOUT DIAGNOSIS OF HYPERTENSION: ICD-10-CM

## 2022-09-27 PROCEDURE — 93784 AMBL BP MNTR W/SOFTWARE: CPT | Performed by: INTERNAL MEDICINE

## 2022-09-27 NOTE — PATIENT INSTRUCTIONS
Patient presents for 24hr ABPM placement ordered by MD. Patient was hooked up to the monitor and instructions were given regarding how long to wear the monitor, how often readings will be taken, how to place the sleeve for optimal results, when and where to return the unit and how results are provided.     Patient was provided with ABPM letter reiterating the above along with our hours for the return of the device.   Patient education was provided about cardiology interpretation and that provider will be notified of the results.    Diagnosis taken from MD order.    September 30, 2018     Patient: Akilah Hermosillo   YOB: 1994   Date of Visit: 9/30/2018       To Whom it May Concern:    Akilah Hermosillo was seen in my clinic on 9/30/2018  She may return to work on 10/01/2018 nightshift  Please excuse starting nightshift 9/28/2018       If you have any questions or concerns, please don't hesitate to call           Sincerely,          Kristin Hoover MD        CC: No Recipients

## 2022-10-04 ENCOUNTER — TELEPHONE (OUTPATIENT)
Dept: FAMILY MEDICINE | Facility: CLINIC | Age: 32
End: 2022-10-04

## 2022-10-04 RX ORDER — LISINOPRIL/HYDROCHLOROTHIAZIDE 10-12.5 MG
1 TABLET ORAL DAILY
Qty: 90 TABLET | Refills: 1 | Status: SHIPPED | OUTPATIENT
Start: 2022-10-04 | End: 2023-03-29

## 2022-10-04 NOTE — TELEPHONE ENCOUNTER
Patient called back. RN relayed provider message below. Patient verbalized understanding of results, new medication, and follow up plan. Patient confirmed he will  the medication and will schedule a follow up visit in 3 months.     No further questions or concerns at this time     Franci Orozco RN    [facility]

## 2022-10-04 NOTE — TELEPHONE ENCOUNTER
Please call patient      Haim May,  Blood pressure is elevated   I am going to add lisinopril - hydrochlorothiazide   Rx sent to pharmacy   Please return in 3 months   Please continue with the plan as we discussed in the clinic.  Feel free to contact the clinic with any questions or concerns. Thank you for allowing us to participate in your care.     Eliza Uriarte MD.

## 2023-03-29 DIAGNOSIS — I10 BENIGN ESSENTIAL HYPERTENSION: ICD-10-CM

## 2023-03-29 RX ORDER — LISINOPRIL/HYDROCHLOROTHIAZIDE 10-12.5 MG
1 TABLET ORAL DAILY
Qty: 90 TABLET | Refills: 1 | Status: SHIPPED | OUTPATIENT
Start: 2023-03-29 | End: 2023-10-13

## 2023-05-04 ENCOUNTER — OFFICE VISIT (OUTPATIENT)
Dept: FAMILY MEDICINE | Facility: CLINIC | Age: 33
End: 2023-05-04
Payer: COMMERCIAL

## 2023-05-04 VITALS
HEIGHT: 74 IN | OXYGEN SATURATION: 94 % | HEART RATE: 86 BPM | BODY MASS INDEX: 40.43 KG/M2 | SYSTOLIC BLOOD PRESSURE: 123 MMHG | DIASTOLIC BLOOD PRESSURE: 85 MMHG | WEIGHT: 315 LBS | TEMPERATURE: 97.1 F

## 2023-05-04 DIAGNOSIS — R20.2 NUMBNESS AND TINGLING OF RIGHT SIDE OF FACE: Primary | ICD-10-CM

## 2023-05-04 DIAGNOSIS — R20.0 NUMBNESS AND TINGLING OF RIGHT SIDE OF FACE: Primary | ICD-10-CM

## 2023-05-04 LAB
ALBUMIN SERPL-MCNC: 4.2 G/DL (ref 3.4–5)
ALP SERPL-CCNC: 93 U/L (ref 40–150)
ALT SERPL W P-5'-P-CCNC: 55 U/L (ref 0–70)
ANION GAP SERPL CALCULATED.3IONS-SCNC: 6 MMOL/L (ref 3–14)
AST SERPL W P-5'-P-CCNC: 33 U/L (ref 0–45)
BILIRUB SERPL-MCNC: 0.6 MG/DL (ref 0.2–1.3)
BUN SERPL-MCNC: 15 MG/DL (ref 7–30)
CALCIUM SERPL-MCNC: 9.1 MG/DL (ref 8.5–10.1)
CHLORIDE BLD-SCNC: 105 MMOL/L (ref 94–109)
CHOLEST SERPL-MCNC: 176 MG/DL
CO2 SERPL-SCNC: 27 MMOL/L (ref 20–32)
CREAT SERPL-MCNC: 0.96 MG/DL (ref 0.66–1.25)
FASTING STATUS PATIENT QL REPORTED: YES
GFR SERPL CREATININE-BSD FRML MDRD: >90 ML/MIN/1.73M2
GLUCOSE BLD-MCNC: 90 MG/DL (ref 70–99)
HBA1C MFR BLD: 5.3 % (ref 0–5.6)
HDLC SERPL-MCNC: 41 MG/DL
LDLC SERPL CALC-MCNC: 113 MG/DL
NONHDLC SERPL-MCNC: 135 MG/DL
POTASSIUM BLD-SCNC: 3.8 MMOL/L (ref 3.4–5.3)
PROT SERPL-MCNC: 8.5 G/DL (ref 6.8–8.8)
SODIUM SERPL-SCNC: 138 MMOL/L (ref 133–144)
TRIGL SERPL-MCNC: 112 MG/DL

## 2023-05-04 PROCEDURE — 80061 LIPID PANEL: CPT | Performed by: NURSE PRACTITIONER

## 2023-05-04 PROCEDURE — 80053 COMPREHEN METABOLIC PANEL: CPT | Performed by: NURSE PRACTITIONER

## 2023-05-04 PROCEDURE — 99000 SPECIMEN HANDLING OFFICE-LAB: CPT | Performed by: NURSE PRACTITIONER

## 2023-05-04 PROCEDURE — 99213 OFFICE O/P EST LOW 20 MIN: CPT | Performed by: NURSE PRACTITIONER

## 2023-05-04 PROCEDURE — 36415 COLL VENOUS BLD VENIPUNCTURE: CPT | Performed by: NURSE PRACTITIONER

## 2023-05-04 PROCEDURE — 82088 ASSAY OF ALDOSTERONE: CPT | Performed by: NURSE PRACTITIONER

## 2023-05-04 PROCEDURE — 83036 HEMOGLOBIN GLYCOSYLATED A1C: CPT | Performed by: NURSE PRACTITIONER

## 2023-05-04 PROCEDURE — 84244 ASSAY OF RENIN: CPT | Mod: 90 | Performed by: NURSE PRACTITIONER

## 2023-05-04 ASSESSMENT — ENCOUNTER SYMPTOMS: NUMBNESS: 1

## 2023-05-04 NOTE — PROGRESS NOTES
"  Assessment & Plan     Numbness and tingling of right side of face  Unclear etiology.   Reports sudden onset of right facial numbness and tingling about 3 days ago.  That resolved but the right side of his face doesn't feel entirely normal yet and he has a pressure sensation over temporal area. No facial asymmetry or vision change. Denies pain.   No rash. No head trauma.    Denies migraine history.   He has a non-focal neuro exam.   Will plan to check MRI, further plan pending results but briefly discussed referral to neurology depending on results and if his symptoms persist.   Discussed symptoms that would warrant a more urgent evaluation.     - MR Brain w/o Contrast; Future     BMI:   Estimated body mass index is 43.73 kg/m  as calculated from the following:    Height as of this encounter: 1.867 m (6' 1.5\").    Weight as of this encounter: 152.4 kg (336 lb).       Denise Cisneros [facility]   Lalo is a 33 year old, presenting for the following health issues:  Numbness        5/4/2023     9:14 AM   Additional Questions   Roomed by hadley   Accompanied by self     Numbness  Associated symptoms include numbness.   History of Present Illness       Reason for visit:  Head pressure, head tingling  Symptom onset:  3-7 days ago  Symptoms include:  Head pressure, tingling  Symptom intensity:  Mild  Symptom progression:  Staying the same  Had these symptoms before:  No  What makes it worse:  At night it seems worse  What makes it better:  Laying down    He eats 2-3 servings of fruits and vegetables daily.He consumes 1 sweetened beverage(s) daily.He exercises with enough effort to increase his heart rate 30 to 60 minutes per day.  He exercises with enough effort to increase his heart rate 4 days per week.   He is taking medications regularly.       Developed sudden onset pins and needles sensation on the right side of his face.    Was playing games with a friend at the time. " "  That sensation lasted about 5 minutes.    No headache.  No facial asymmetry.  No confusion or troubles with speech.  No vision change.  No motor weakness.   The next day, his right temporal area felt different, like a pressure.     Still getting abnormal facial sensation, as if something is brushing it and slight numbness.    No rashes. No pain.     No chest pain or shortness of breath.     No recent illness or fever.     Denies hx of migraines or other neurologic diagnoses.            Review of Systems   Neurological: Positive for numbness.   ROS: 10 point ROS neg other than the symptoms noted above in the HPI and above patient answer.          Objective    /85   Pulse 86   Temp 97.1  F (36.2  C)   Ht 1.867 m (6' 1.5\")   Wt (!) 152.4 kg (336 lb)   SpO2 94%   BMI 43.73 kg/m    Body mass index is 43.73 kg/m .  Physical Exam   GENERAL: healthy, alert and no distress  EYES: Eyes grossly normal to inspection, PERRL and conjunctivae and sclerae normal  HENT: normal cephalic/atraumatic, oropharynx clear and oral mucous membranes moist  NECK: no adenopathy, no asymmetry, masses, or scars and thyroid normal to palpation  RESP: lungs clear to auscultation - no rales, rhonchi or wheezes  CV: regular rate and rhythm, normal S1 S2, no S3 or S4, no murmur, click or rub, no peripheral edema and peripheral pulses strong  MS: no gross musculoskeletal defects noted, no edema  SKIN: no suspicious lesions or rashes  NEURO: Normal strength and tone, mentation intact and speech normal.  No facial asymmetry. Reports slight decreased sensation with monofilament to right middle cheek when compared to left.  Speech is clear.    PSYCH: mentation appears normal, affect normal/bright              "

## 2023-05-04 NOTE — PATIENT INSTRUCTIONS
Will check an MRI due to your symptoms.     If MRI is normal but sensation persists, we will have you see neurology.   To ER with new or worsening symptoms.

## 2023-05-05 LAB — ALDOST SERPL-MCNC: 15.8 NG/DL (ref 0–31)

## 2023-05-14 LAB — RENIN PLAS-CCNC: 24.6 NG/ML/HR

## 2023-05-15 LAB — ALDOST/RENIN PLAS-RTO: 0.6 {RATIO} (ref 0–25)

## 2023-05-16 ENCOUNTER — ANCILLARY PROCEDURE (OUTPATIENT)
Dept: MRI IMAGING | Facility: CLINIC | Age: 33
End: 2023-05-16
Attending: NURSE PRACTITIONER
Payer: COMMERCIAL

## 2023-05-16 DIAGNOSIS — R20.2 NUMBNESS AND TINGLING OF RIGHT SIDE OF FACE: ICD-10-CM

## 2023-05-16 DIAGNOSIS — R20.0 NUMBNESS AND TINGLING OF RIGHT SIDE OF FACE: ICD-10-CM

## 2023-05-16 PROCEDURE — 70551 MRI BRAIN STEM W/O DYE: CPT | Mod: TC | Performed by: RADIOLOGY

## 2023-07-06 ASSESSMENT — SLEEP AND FATIGUE QUESTIONNAIRES
HOW LIKELY ARE YOU TO NOD OFF OR FALL ASLEEP WHILE SITTING AND READING: SLIGHT CHANCE OF DOZING
HOW LIKELY ARE YOU TO NOD OFF OR FALL ASLEEP WHILE SITTING AND TALKING TO SOMEONE: WOULD NEVER DOZE
HOW LIKELY ARE YOU TO NOD OFF OR FALL ASLEEP WHILE WATCHING TV: MODERATE CHANCE OF DOZING
HOW LIKELY ARE YOU TO NOD OFF OR FALL ASLEEP WHILE LYING DOWN TO REST IN THE AFTERNOON WHEN CIRCUMSTANCES PERMIT: HIGH CHANCE OF DOZING
HOW LIKELY ARE YOU TO NOD OFF OR FALL ASLEEP WHILE SITTING QUIETLY AFTER LUNCH WITHOUT ALCOHOL: MODERATE CHANCE OF DOZING
HOW LIKELY ARE YOU TO NOD OFF OR FALL ASLEEP WHEN YOU ARE A PASSENGER IN A CAR FOR AN HOUR WITHOUT A BREAK: SLIGHT CHANCE OF DOZING
HOW LIKELY ARE YOU TO NOD OFF OR FALL ASLEEP IN A CAR, WHILE STOPPED FOR A FEW MINUTES IN TRAFFIC: WOULD NEVER DOZE
HOW LIKELY ARE YOU TO NOD OFF OR FALL ASLEEP WHILE SITTING INACTIVE IN A PUBLIC PLACE: WOULD NEVER DOZE

## 2023-07-06 NOTE — PROGRESS NOTES
Outpatient Sleep Medicine Consultation:      Name: Lalo Hill MRN# [id number]   Age: 33 year old YOB: 1990     Date of Consultation: July 6, 2023  Consultation is requested by: No referring provider defined for this encounter. No ref. provider found  Primary care provider: Skip Josue       Reason for Sleep Consult:     Lalo Hill is self-referred for a sleep consultation regarding suspected sleep apnea.    Patient s Reason for visit  Lalo Hill main reason for visit: Suspected sleep apnea  Patient states problem(s) started: 2-3 yrs ago or more  Lalo Hill's goals for this visit: Undersrand my options           Assessment and Plan:     Summary Sleep Diagnoses and Recommendations:  (R06.83) Snoring  (primary encounter diagnosis), (G47.30) Observed sleep apnea, (Z68.41) BMI 40.0-44.9, adult (H)  Comment: Lalo presents with concerns of obstructive sleep apnea.  He spent a night staying at a friend's house about a year ago and his snoring was heard from a different level of the house.  He was also observed to have pauses in his breathing.  Lalo has felt unrefreshed by his sleep in the last couple of years.  He dozes inadvertently while playing video games and sometimes at work.  His ESS is just subthreshold at 9/24.  STOP BANG TOTAL: 6 snoring, tired, observed apnea, HTN, BMI >35 (43), male gender.  Negative risk factors include age<50 (33).  We do not have a neck circumference measurement.  There is mild risk for hypoventilation given his BMI of 43.  His CO2 levels on metabolic panels have been no higher than 26.  His SPO2 at his last in clinic visit was 94%.  He has occasional morning headaches (2x/month).  He is otherwise young and healthy suggesting somewhat lower risk for hypoventilation.  Plan: HST-Home Sleep Apnea Test - Noxturnal Returnable               Comorbid Diagnoses:  SVT, BMI 43, bicuspid aortic valve, HTN    Summary Counseling:    Sleep Testing  Reviewed  Obstructive Sleep Apnea Reviewed  Complications of Untreated Sleep Apnea Reviewed      Patient will follow up 2 weeks after sleep study.  If his follow-up visit cannot be scheduled shortly after his sleep study, I encouraged him to send me a MyChart note after the test to ask me for a summary of the results and a prescription to initiate CPAP prior to the follow-up.  Bennett Goltz, PA-C      Total time spent reviewing medical records, history and physical examination, review of previous testing and interpretation as well as documentation on this date: 38 min    CC: No ref. provider found          History of Present Illness:     Lalo presents because about a year ago friend observed him to stop breathing occasionally in his sleep. He also snored very loudly, heard from a different floor of the house. He has noticed being tired in the day. When playing video games online with friends, he has dozed off. He does not feel refreshed when he wakes.    His last SpO2 in clinic was 94%. His CO2 on metabolic panel has been 26 or less.     He says he already has a home study schedule with [employer] or [employer].    Past Sleep Evaluations: none    SLEEP-WAKE SCHEDULE:     Work/School Days: Patient goes to school/work: Yes   Usually gets into bed at 12:00am-12:30am  Takes patient about 5-10 min to fall asleep  Has trouble falling asleep 0 nights per week  Wakes up in the middle of the night 1, 2, sometimes 3 times times.  Wakes up due to Use the bathroom;Uncertain. RRx1-2  He has trouble falling back asleep 0 times a week.   It usually takes 5 min to get back to sleep  Patient is usually up at 8:00-8:30 AM. It is a struggle to get up then.  Uses alarm: Yes    Weekends/Non-work Days/All Other Days:  Usually gets into bed at 1am   Takes patient about 5-10min to fall asleep  Patient is usually up at 10am  Uses alarm: No   He does not always feel better when he sleeps in on weekends.    Sleep Need  Patient gets  7hrs  sleep on average   Patient thinks he needs about 7 or 8 hrs sleep    Lalo Hill prefers to sleep in this position(s): Side   Patient states they do the following activities in bed: Use phone, computer, or tablet-<10 min    Naps  Patient takes a purposeful nap 0 times a week and naps are usually 1hr in duration  He feels better after a nap: No  He dozes off unintentionally 3-4 days per week, video games, at work in the afternoon or at the end of the day, watching TV.  Patient has had a driving accident or near-miss due to sleepiness/drowsiness: No      SLEEP DISRUPTIONS:    Breathing/Snoring  Patient snores:Yes  Other people complain about his snoring: Yes  Patient has been told he stops breathing in his sleep:Yes  He has issues with the following: Morning headaches;Morning mouth dryness;Getting up to urinate more than once. AM headaches are maybe a couple per month. No nocturnal reflux or heartburn.    Movement:  Patient gets pain, discomfort, with an urge to move:  No RLS symptoms  It happens when he is resting:  No  It happens more at night:  No  Patient has been told he kicks his legs at night:  No     Behaviors in Sleep:  Lalo Hill has experienced the following behaviors while sleeping: Teeth grinding-does not think it is a regular occurrence, no bite guard, no sore jaw in the morning  Pt denies sleep talking, sleep walking, and dream enactment behavior. Pt denies sleep paralysis, hypnagogue and cataplexy.     Is there anything else you would like your sleep provider to know: No      CAFFEINE AND OTHER SUBSTANCES:    Patient consumes caffeinated beverages per day:  1-2 pop  Last caffeine use is usually: No later than 8pm  List of any prescribed or over the counter stimulants that patient takes: No  List of any prescribed or over the counter sleep medication patient takes: None  List of previous sleep medications that patient has tried: None  Patient drinks alcohol to help them sleep: No  Patient drinks  alcohol near bedtime: No    Family History:  Patient has a family member been diagnosed with a sleep disorder: No      Social History:  He works as a .  He lives alone      SCALES:    EPWORTH SLEEPINESS SCALE         7/6/2023     9:29 AM    Nowata Sleepiness Scale ( SEVERINO Jc  [id number]<br>ESS - [place]/English - Final version - 21 Nov 07 - [facility] [place].)   Sitting and reading Slight chance of dozing   Watching TV Moderate chance of dozing   Sitting, inactive in a public place (e.g. a theatre or a meeting) Would never doze   As a passenger in a car for an hour without a break Slight chance of dozing   Lying down to rest in the afternoon when circumstances permit High chance of dozing   Sitting and talking to someone Would never doze   Sitting quietly after a lunch without alcohol Moderate chance of dozing   In a car, while stopped for a few minutes in traffic Would never doze   Nowata Score (MC) 9   Nowata Score (Sleep) 9         INSOMNIA SEVERITY INDEX (KAYDEN)          7/6/2023     9:11 AM   Insomnia Severity Index (KAYDEN)   Difficulty falling asleep 1   Difficulty staying asleep 2   Problems waking up too early 0   How SATISFIED/DISSATISFIED are you with your CURRENT sleep pattern? 3   How NOTICEABLE to others do you think your sleep problem is in terms of impairing the quality of your life? 3   How WORRIED/DISTRESSED are you about your current sleep problem? 2   To what extent do you consider your sleep problem to INTERFERE with your daily functioning (e.g. daytime fatigue, mood, ability to function at work/daily chores, concentration, memory, mood, etc.) CURRENTLY? 3   KAYDEN Total Score 14       Guidelines for Scoring/Interpretation:  Total score categories:  0-7 = No clinically significant insomnia   8-14 = Subthreshold insomnia   15-21 = Clinical insomnia (moderate severity)  22-28 = Clinical insomnia (severe)  Used via courtesy of www.[place].gov with permission from Gabriel Soin  "PhD., [facility]      STOP BANG         7/6/2023     9:29 AM   STOP BANG Questionnaire (  2008, the American Society of Anesthesiologists, Inc. [employer]   1. Snoring - Do you snore loudly (louder than talking or loud enough to be heard through closed doors)? Yes   2. Tired - Do you often feel tired, fatigued, or sleepy during daytime? Yes   3. Observed - Has anyone observed you stop breathing during your sleep? Yes   4. Blood pressure - Do you have or are you being treated for high blood pressure? Yes   5. BMI - BMI more than 35 kg/m2? Yes   6. Age - Age over 50 yr old? No   7. Neck circumference - Neck circumference greater than 40 cm? Yes   8. Gender - Gender male? Yes   STOP BANG Score (MC): 6 (High risk of JOSE)         GAD7         No data to display                  CAGE-AID         No data to display                CAGE-AID reprinted with permission from the [place] Medical Journal, OLYA Novak. and [place], \"Conjoint screening questionnaires for alcohol and drug abuse\" [place] Medical Journal 94: 135-140, 1995.      PATIENT HEALTH QUESTIONNAIRE-9 (PHQ - 9)         No data to display                Developed by Eula Bull, Saima Ace, Michele Boston and colleagues, with an educational madeleine from [employer] No permission required to reproduce, translate, display or distribute.        Allergies:    Allergies   Allergen Reactions     Erythro [Erythromycin] Rash     Erythromycin Rash     Patient unsure - reaction occurred in a child       Medications:    Current Outpatient Medications   Medication Sig Dispense Refill     lisinopril-hydrochlorothiazide (ZESTORETIC) 10-12.5 MG tablet Take 1 tablet by mouth daily 90 tablet 1     metoprolol succinate ER (TOPROL XL) 100 MG 24 hr tablet          Problem List:  Patient Active Problem List    Diagnosis Date Noted     SVT (supraventricular tachycardia) (H) 09/26/2022     Priority: Medium     Bicuspid aortic valve " 11/15/2017     Priority: Medium     Nonintractable headache, unspecified chronicity pattern, unspecified headache type 11/15/2017     Priority: Medium     BMI 36.0-36.9,adult 11/15/2017     Priority: Medium     CARDIOVASCULAR SCREENING; LDL GOAL LESS THAN 160 11/26/2014     Priority: Medium     Drusen (degenerative) of retina 11/26/2014     Priority: Medium     Elevated blood pressure reading without diagnosis of hypertension 11/26/2014     Priority: Medium        Past Medical/Surgical History:  Past Medical History:   Diagnosis Date     * * * SBE PROPHYLAXIS * * *     Bicuspid aortic valve/needs prophylaxis for dental procedures     Hypertension      SVT (supraventricular tachycardia) (H)      SVT (supraventricular tachycardia) (H) 9/26/2022     History reviewed. No pertinent surgical history.    Social History:  Social History     Socioeconomic History     Marital status: Single     Spouse name: Not on file     Number of children: Not on file     Years of education: Not on file     Highest education level: Not on file   Occupational History     Not on file   Tobacco Use     Smoking status: Never     Smokeless tobacco: Never   Vaping Use     Vaping Use: Never used   Substance and Sexual Activity     Alcohol use: Yes     Alcohol/week: 2.0 standard drinks of alcohol     Types: 2 Cans of beer per week     Comment: rare, not even monthly     Drug use: No     Sexual activity: Not Currently     Partners: Female     Birth control/protection: Condom, I.U.D.   Other Topics Concern     Parent/sibling w/ CABG, MI or angioplasty before 65F 55M? No   Social History Narrative     Not on file     Social Determinants of Health     Financial Resource Strain: Not on file   Food Insecurity: Not on file   Transportation Needs: Not on file   Physical Activity: Not on file   Stress: Not on file   Social Connections: Not on file   Intimate Partner Violence: Not on file   Housing Stability: Not on file       Family History:  Family  "History   Problem Relation Age of Onset     Hypertension Father      Cancer Maternal Grandmother      C.A.D. Maternal Grandfather      Cerebrovascular Disease Maternal Grandfather      Prostate Cancer Maternal Grandfather      C.A.D. Paternal Grandmother      Blood Disease Paternal Grandmother      C.A.D. Paternal Grandfather      Cerebrovascular Disease Paternal Grandfather      Hypertension Paternal Grandfather      Prostate Cancer Paternal Grandfather        Review of Systems:  A complete review of systems reviewed by me is negative with the exeption of what has been mentioned in the history of present illness.  In the last TWO WEEKS have you experienced any of the following symptoms?  Fevers: No  Night Sweats: No  Weight Gain: No  Pain at Night: No  Double Vision: No  Changes in Vision: No  Difficulty Breathing through Nose: Yes  Sore Throat in Morning: No  Dry Mouth in the Morning: Yes  Shortness of Breath Lying Flat: No  Shortness of Breath With Activity: No  Awakening with Shortness of Breath: No  Increased Cough: No  Heart Racing at Night: No  Swelling in Feet or Legs: No  Diarrhea at Night: No  Heartburn at Night: No  Urinating More than Once at Night: Yes  Losing Control of Urine at Night: No  Joint Pains at Night: No  Headaches in Morning: Yes  Weakness in Arms or Legs: No  Depressed Mood: No  Anxiety: Yes     Physical Examination:  Vitals: Ht 1.854 m (6' 1\")   Wt 149.7 kg (330 lb)   BMI 43.54 kg/m             GENERAL APPEARANCE: healthy, alert, no distress and cooperative  EYES: Eyes grossly normal to inspection and wearing glasses  HENT: oropharynx moderately crowded and oral mucous membranes moist  NECK: no asymmetry, masses, or scars  RESP: no respiratory distress, cough or wheeze  Mallampati Class: II.  Tonsillar Stage: 1  hidden by pillars.         Data: All pertinent previous laboratory data reviewed     Recent Labs   Lab Test 05/04/23  0955 11/15/17  0836    139   POTASSIUM 3.8 3.8 "   CHLORIDE 105 106   CO2 27 25   ANIONGAP 6 8   GLC 90 92   BUN 15 10   CR 0.96 0.89   RENITA 9.1 9.0       No results for input(s): WBC, RBC, HGB, HCT, MCV, MCH, MCHC, RDW, PLT in the last 89625 hours.    Recent Labs   Lab Test 05/04/23  0955   PROTTOTAL 8.5   ALBUMIN 4.2   BILITOTAL 0.6   ALKPHOS 93   AST 33   ALT 55       No results found for: TSH    No results found for: UAMP, UBARB, BENZODIAZEUR, UCANN, UCOC, OPIT, UPCP    No results found for: IRONSAT, [id number], GIGI    No results found for: PH, PHARTERIAL, PO2, TF2SGSBVQUL, SAT, PCO2, HCO3, BASEEXCESS, SHERRY, BEB    @LABRCNTIPR(phv:4,pco2v:4,po2v:4,hco3v:4,lilliana:4,o2per:4)@    Echocardiology: No results found for this or any previous visit (from the past 4320 hour(s)).    Chest x-ray: No results found for this or any previous visit from the past 365 days.      Chest CT: No results found for this or any previous visit from the past 365 days.      PFT: Most Recent [facility] Pulmonary Function Testing    No results found for: 20001  No results found for: 20002  No results found for: 20003  No results found for: 20015  No results found for: 20016  No results found for: 20027  No results found for: 20028  No results found for: 20029  No results found for: 20079  No results found for: 20080  No results found for: 20081  No results found for: 20335  No results found for: 20105  No results found for: 20053  No results found for: 20054  No results found for: 20055      Bennett Ezra Goltz, PA-C, PAYAL 7/6/2023

## 2023-07-07 ENCOUNTER — VIRTUAL VISIT (OUTPATIENT)
Dept: SLEEP MEDICINE | Facility: CLINIC | Age: 33
End: 2023-07-07
Payer: COMMERCIAL

## 2023-07-07 VITALS — WEIGHT: 315 LBS | HEIGHT: 73 IN | BODY MASS INDEX: 41.75 KG/M2

## 2023-07-07 DIAGNOSIS — G47.30 OBSERVED SLEEP APNEA: ICD-10-CM

## 2023-07-07 DIAGNOSIS — R06.83 SNORING: Primary | ICD-10-CM

## 2023-07-07 PROCEDURE — 99203 OFFICE O/P NEW LOW 30 MIN: CPT | Mod: VID | Performed by: PHYSICIAN ASSISTANT

## 2023-07-07 ASSESSMENT — PAIN SCALES - GENERAL: PAINLEVEL: NO PAIN (0)

## 2023-07-07 NOTE — PROGRESS NOTES
Virtual Visit Details    Type of service:  Video Visit   Start Time: 12:57 PM  End Time: 1:31 PM     Originating Location (pt. Location): Home    Distant Location (provider location):  On-site  Platform used for Video Visit: [place]

## 2023-07-07 NOTE — PATIENT INSTRUCTIONS
"          MY TREATMENT INFORMATION FOR SLEEP APNEA-  [place]    DOCTOR : Bennett Goltz, PA-C    Am I having a sleep study at a sleep center?  --->Due to normal delays, you will be contacted within 2-4 weeks to schedule    Am I having a home sleep study?  --->Watch the video for the device you are using:    -/drop off device-   https://www.Skimo TV.com/watch?v=yGGFBdELGhk    Frequently asked questions:  1. What is Obstructive Sleep Apnea (JOSE)? JOSE is the most common type of sleep apnea. Apnea means, \"without breath.\"  Apnea is most often caused by narrowing or collapse of the upper airway as muscles relax during sleep.   Almost everyone has occasional apneas. Most people with sleep apnea have had brief interruptions at night frequently for many years.  The severity of sleep apnea is related to how frequent and severe the events are.   2. What are the consequences of JOSE? Symptoms include: feeling sleepy during the day, snoring loudly, gasping or stopping of breathing, trouble sleeping, and occasionally morning headaches or heartburn at night.  Sleepiness can be serious and even increase the risk of falling asleep while driving. Other health consequences may include development of high blood pressure and other cardiovascular disease in persons who are susceptible. Untreated JOSE  can contribute to heart disease, stroke and diabetes.   3. What are the treatment options? In most situations, sleep apnea is a lifelong disease that must be managed with daily therapy. Medications are not effective for sleep apnea and surgery is generally not considered until other therapies have been tried. Your treatment is your choice. Continuous Positive Airway (CPAP) works right away and is the therapy that is effective in nearly everyone. An oral device to hold your jaw forward is usually the next most reliable option. Other options include postioning devices (to keep you off your back), weight loss, and surgery including a " tongue pacing device. There is more detail about some of these options below.  4. Are my sleep studies covered by insurance? Although we will request verification of coverage, we advise you also check in advance of the study to ensure there is coverage.    Important tips for those choosing CPAP and similar devices  For new devices, sign up for device DENISE to monitor your device for your followup visits  We encourage you to utilize the [employer] denise or website (myAir web (resmed.com) ) to monitor your therapy progress and share the data with your healthcare team when you discuss your sleep apnea.                                                    Know your equipment:  CPAP is continuous positive airway pressure that prevents obstructive sleep apnea by keeping the throat from collapsing while you are sleeping. In most cases, the device is  smart  and can slowly self-adjusts if your throat collapses and keeps a record every day of how well you are treated-this information is available to you and your care team.  BPAP is bilevel positive airway pressure that keeps your throat open and also assists each breath with a pressure boost to maintain adequate breathing.  Special kinds of BPAP are used in patients who have inadequate breathing from lung or heart disease. In most cases, the device is  smart  and can slowly self-adjusts to assist breathing. Like CPAP, the device keeps a record of how well you are treated.  Your mask is your connection to the device. You get to choose what feels most comfortable and the staff will help to make sure if fits. Here: are some examples of the different masks that are available:       Key points to remember on your journey with sleep apnea:  Sleep study.  PAP devices often need to be adjusted during a sleep study to show that they are effective and adjusted right.  Good tips to remember: Try wearing just the mask during a quiet time during the day so your body adapts to wearing it.  A humidifier is recommended for comfort in most cases to prevent drying of your nose and throat. Allergy medication from your provider may help you if you are having nasal congestion.  Getting settled-in. It takes more than one night for most of us to get used to wearing a mask. Try wearing just the mask during a quiet time during the day so your body adapts to wearing it. A humidifier is recommended for comfort in most cases. Our team will work with you carefully on the first day and will be in contact within 4 days and again at 2 and 4 weeks for advice and remote device adjustments. Your therapy is evaluated by the device each day.   Use it every night. The more you are able to sleep naturally for 7-8 hours, the more likely you will have good sleep and to prevent health risks or symptoms from sleep apnea. Even if you use it 4 hours it helps. Occasionally all of us are unable to use a medical therapy, in sleep apnea, it is not dangerous to miss one night.   Communicate. Call our skilled team on the number provided on the first day if your visit for problems that make it difficult to wear the device. Over 2 out of 3 patients can learn to wear the device long-term with help from our team. Remember to call our team or your sleep providers if you are unable to wear the device as we may have other solutions for those who cannot adapt to mask CPAP therapy. It is recommended that you sleep your sleep provider within the first 3 months and yearly after that if you are not having problems.   Use it for your health. We encourage use of CPAP masks during daytime quiet periods to allow your face and brain to adapt to the sensation of CPAP so that it will be a more natural sensation to awaken to at night or during naps. This can be very useful during the first few weeks or months of adapting to CPAP though it does not help medically to wear CPAP during wakefulness and  should not be used as a strategy just to meet  guidelines.  Take care of your equipment. Make sure you clean your mask and tubing using directions every day and that your filter and mask are replaced as recommended or if they are not working.     BESIDES CPAP, WHAT OTHER THERAPIES ARE THERE?    Positioning Device  Positioning devices are generally used when sleep apnea is mild and only occurs on your back.This example shows a pillow that straps around the waist. It may be appropriate for those whose sleep study shows milder sleep apnea that occurs primarily when lying flat on one's back. Preliminary studies have shown benefit but effectiveness at home may need to be verified by a home sleep test. These devices are generally not covered by medical insurance.  Examples of devices that maintain sleeping on the back to prevent snoring and mild sleep apnea.    Belt type body positioner  [email or web address]/    Electronic reminder  http://nightshifttherapy.com/            Oral Appliance  What is oral appliance therapy?  An oral appliance device fits on your teeth at night like a retainer used after having braces. The device is made by a specialized dentist and requires several visits over 1-2 months before a manufactured device is made to fit your teeth and is adjusted to prevent your sleep apnea. Once an oral device is working properly, snoring should be improved. A home sleep test may be recommended at that time if to determine whether the sleep apnea is adequately treated.       Some things to remember:  -Oral devices are often, but not always, covered by your medical insurance. Be sure to check with your insurance provider.   -If you are referred for oral therapy, you will be given a list of specialized dentists to consider or you may choose to visit the Web site of the American Academy of Dental Sleep Medicine  -Oral devices are less likely to work if you have severe sleep apnea or are extremely overweight.     More detailed information  An oral appliance is a  small acrylic device that fits over the upper and lower teeth  (similar to a retainer or a mouth guard). This device slightly moves jaw forward, which moves the base of the tongue forward, opens the airway, improves breathing for effective treat snoring and obstructive sleep apnea in perhaps 7 out of 10 people .  The best working devices are custom-made by a dental device  after a mold is made of the teeth 1, 2, 3.  When is an oral appliance indicated?  Oral appliance therapy is recommended as a first-line treatment for patients with primary snoring, mild sleep apnea, and for patients with moderate sleep apnea who prefer appliance therapy to use of CPAP4, 5. Severity of sleep apnea is determined by sleep testing and is based on the number of respiratory events per hour of sleep.   How successful is oral appliance therapy?  The success rate of oral appliance therapy in patients with mild sleep apnea is 75-80% while in patients with moderate sleep apnea it is 50-70%. The chance of success in patients with severe sleep apnea is 40-50%. The research also shows that oral appliances have a beneficial effect on the cardiovascular health of JOSE patients at the same magnitude as CPAP therapy7.  Oral appliances should be a second-line treatment in cases of severe sleep apnea, but if not completely successful then a combination therapy utilizing CPAP plus oral appliance therapy may be effective. Oral appliances tend to be effective in a broad range of patients although studies show that the patients who have the highest success are females, younger patients, those with milder disease, and less severe obesity. 3, 6.   Finding a dentist that practices dental sleep medicine  Specific training is available through the American Academy of Dental Sleep Medicine for dentists interested in working in the field of sleep. To find a dentist who is educated in the field of sleep and the use of oral appliances, near you, visit  the Web site of the American Academy of Dental Sleep Medicine.    References  1. José Manuel, et al. Objectively measured vs self-reported compliance during oral appliance therapy for sleep-disordered breathing. Chest 2013; 144(5): 1915-9166.  2. Josy et al. Objective measurement of compliance during oral appliance therapy for sleep-disordered breathing. Thorax 2013; 68(1): 91-96.  3. Al et al. Mandibular advancement devices in 620 men and women with JOSE and snoring: tolerability and predictors of treatment success. Chest 2004; 125: 5856-1166.  4. Gwen et al. Oral appliances for snoring and JOSE: a review. Sleep 2006; 29: 244-262.  5. Niharika et al. Oral appliance treatment for JOSE: an update. J Clin Sleep Med 2014; 10(2): 215-227.  6. Denys et al. Predictors of OSAH treatment outcome. J Dent Res 2007; 86: 4765-1443.      Weight Loss:    Weight loss is a long-term strategy that may improve sleep apnea in some patients.    Weight management is a personal decision and the decision should be based on your interest and the potential benefits.  If you are interested in exploring weight loss strategies, the following discussion covers the impact on weight loss on sleep apnea and the approaches that may be successful.    Being overweight does not necessarily mean you will have health consequences.  Those who have BMI over 35 or over 27 with existing medical conditions carries greater risk.   Weight loss decreases severity of sleep apnea in most people with obesity. For those with mild obesity who have developed snoring with weight gain, even 15-30 pound weight loss can improve and occasionally eliminate sleep apnea.  Structured and life-long dietary and health habits are necessary to lose weight and keep healthier weight levels.     Though there may be significant health benefits from weight loss, long-term weight loss is very difficult to achieve- studies show success with dietary management in  less than 10% of people. In addition, substantial weight loss may require years of dietary control and may be difficult if patients have severe obesity. In these cases, surgical management may be considered.  Finally, older individuals who have tolerated obesity without health complications may be less likely to benefit from weight loss strategies.      BMI 43    Surgery:    Surgery for obstructive sleep apnea is considered generally only when other therapies fail to work. Surgery may be discussed with you if you are having a difficult time tolerating CPAP and or when there is an abnormal structure that requires surgical correction.  Nose and throat surgeries often enlarge the airway to prevent collapse.  Most of these surgeries create pain for 1-2 weeks and up to half of the most common surgeries are not effective throughout life.  You should carefully discuss the benefits and drawbacks to surgery with your sleep provider and surgeon to determine if it is the best solution for you.   More information  Surgery for JOSE is directed at areas that are responsible for narrowing or complete obstruction of the airway during sleep.  There are a wide range of procedures available to enlarge and/or stabilize the airway to prevent blockage of breathing in the three major areas where it can occur: the palate, tongue, and nasal regions.  Successful surgical treatment depends on the accurate identification of the factors responsible for obstructive sleep apnea in each person.  A personalized approach is required because there is no single treatment that works well for everyone.  Because of anatomic variation, consultation with an examination by a sleep surgeon is a critical first step in determining what surgical options are best for each patient.  In some cases, examination during sedation may be recommended in order to guide the selection of procedures.  Patients will be counseled about risks and benefits as well as the typical  recovery course after surgery. Surgery is typically not a cure for a person s JOSE.  However, surgery will often significantly improve one s JOSE severity (termed  success rate ).  Even in the absence of a cure, surgery will decrease the cardiovascular risk associated with OSA7; improve overall quality of life8 (sleepiness, functionality, sleep quality, etc).  Palate Procedures:  Patients with JOSE often have narrowing of their airway in the region of their tonsils and uvula.  The goals of palate procedures are to widen the airway in this region as well as to help the tissues resist collapse.  Modern palate procedure techniques focus on tissue conservation and soft tissue rearrangement, rather than tissue removal.  Often the uvula is preserved in this procedure. Residual sleep apnea is common in patient after pharyngoplasty with an average reduction in sleep apnea events of 33%2.    Tongue Procedures:  ExamWhile patients are awake, the muscles that surround the throat are active and keep this region open for breathing. These muscles relax during sleep, allowing the tongue and other structures to collapse and block breathing.  There are several different tongue procedures available.  Selection of a tongue base procedure depends on characteristics seen on physical exam.  Generally, procedures are aimed at removing bulky tissues in this area or preventing the back of the tongue from falling back during sleep.  Success rates for tongue surgery range from 50-62%3.  Hypoglossal Nerve Stimulation:  Hypoglossal nerve stimulation has recently received approval from the [place] Food and Drug Administration for the treatment of obstructive sleep apnea.  This is based on research showing that the system was safe and effective in treating sleep apnea6.  Results showed that the median AHI score decreased 68%, from 29.3 to 9.0. This therapy uses an implant system that senses breathing patterns and delivers mild stimulation to  airway muscles, which keeps the airway open during sleep.  The system consists of three fully implanted components: a small generator (similar in size to a pacemaker), a breathing sensor, and a stimulation lead.  Using a small handheld remote, a patient turns the therapy on before bed and off upon awakening.    Candidates for this device must be greater than 18 years of age, have moderate to severe JOSE (AHI between 15-65), BMI less than 35, have tried CPAP/oral appliance for at least 8 weeks without success, and have appropriate upper airway anatomy (determined by a sleep endoscopy performed by Dr. Rakesh Boston).  Hypoglossal Nerve Stimulation Pathway:    The sleep surgeon s office will work with the patient through the insurance prior-authorization process (including communications and appeals).    Nasal Procedures:  Nasal obstruction can interfere with nasal breathing during the day and night.  Studies have shown that relief of nasal obstruction can improve the ability of some patients to tolerate positive airway pressure therapy for obstructive sleep apnea1.  Treatment options include medications such as nasal saline, topical corticosteroid and antihistamine sprays, and oral medications such as antihistamines or decongestants. Non-surgical treatments can include external nasal dilators for selected patients. If these are not successful by themselves, surgery can improve the nasal airway either alone or in combination with these other options.  Combination Procedures:  Combination of surgical procedures and other treatments may be recommended, particularly if patients have more than one area of narrowing or persistent positional disease.  The success rate of combination surgery ranges from 66-80%2,3.    References  Mignon MOTLEY. The Role of the Nose in Snoring and Obstructive Sleep Apnoea: An Update.  Eur Arch Otorhinolaryngol. 2011; 268: 1365-73.   Parvin SM; Bruno AMANDA; Fco JR; Pallanch JF; Tiffani MCMULLEN; Nevin SG;  Bonnie PARK. Surgical modifications of the upper airway for obstructive sleep apnea in adults: a systematic review and meta-analysis. SLEEP 2010;33(10):0303-2767. Oneal CM. Hypopharyngeal surgery in obstructive sleep apnea: an evidence-based medicine review.  Arch Otolaryngol Head Neck Surg. 2006 Feb;132(2):206-13.  Akil YH1, Emmie Y, Milad VINCENT. The efficacy of anatomically based multilevel surgery for obstructive sleep apnea. Otolaryngol Head Neck Surg. 2003 Oct;129(4):327-35.  Kezirian E, Goldberg A. Hypopharyngeal Surgery in Obstructive Sleep Apnea: An Evidence-Based Medicine Review. Arch Otolaryngol Head Neck Surg. 2006 Feb;132(2):206-13.  Charly CARTER et al. Upper-Airway Stimulation for Obstructive Sleep Apnea.  N Engl J Med. 2014 Jan 9;370(2):139-49.  Gallo Y et al. Increased Incidence of Cardiovascular Disease in Middle-aged Men with Obstructive Sleep Apnea. Am J Respir Crit Care Med; 2002 166: 159-165  Li EM et al. Studying Life Effects and Effectiveness of Palatopharyngoplasty (SLEEP) study: Subjective Outcomes of Isolated Uvulopalatopharyngoplasty. Otolaryngol Head Neck Surg. 2011; 144: 623-631.    WHAT IF I ONLY HAVE SNORING?    Mandibular advancement devices, lateral sleep positioning, long-term weight loss and treatment of nasal allergies have been shown to improve snoring.  Exercising tongue muscles with a game (https://apps.Pirq/us/denise/soundly-reduce-snoring/oe0629740823) or stimulating the tongue during the day with a device (https://doi.org/10.3390/efa95394315) have improved snoring in some individuals.    Remember to Drive Safe... Drive Alive     Sleep health profoundly affects your health, mood, and your safety.  Thirty three percent of the population (one in three of us) is not getting enough sleep and many have a sleep disorder. Not getting enough sleep or having an untreated / undertreated sleep condition may make us sleepy without even knowing it. In fact, our driving could be  dramatically impaired due to our sleep health. As your provider, here are some things I would like you to know about driving:     Here are some warning signs for impairment and dangerous drowsy driving:              -Having been awake more than 16 hours               -Looking tired               -Eyelid drooping              -Head nodding (it could be too late at this point)              -Driving for more than 30 minutes     Some things you could do to make the driving safer if you are experiencing some drowsiness:              -Stop driving and rest              -Call for transportation              -Make sure your sleep disorder is adequately treated     Some things that have been shown NOT to work when experiencing drowsiness while driving:              -Turning on the radio              -Opening windows              -Eating any  distracting  /  entertaining  foods (e.g., sunflower seeds, candy, or any other)              -Talking on the phone      Your decision may not only impact your life, but also the life of others. Please, remember to drive safe for yourself and all of us.

## 2023-07-07 NOTE — NURSING NOTE
Is the patient currently in the state of [place]? YES    Visit mode:VIDEO    If the visit is dropped, the patient can be reconnected by: VIDEO VISIT: Text to cell phone: [phone]    Will anyone else be joining the visit? NO      How would you like to obtain your AVS? MyChart    Are changes needed to the allergy or medication list? NO    Reason for visit: Consult      Viet Husain

## 2023-07-11 ENCOUNTER — OFFICE VISIT (OUTPATIENT)
Dept: FAMILY MEDICINE | Facility: CLINIC | Age: 33
End: 2023-07-11
Payer: COMMERCIAL

## 2023-07-11 ENCOUNTER — ANCILLARY PROCEDURE (OUTPATIENT)
Dept: GENERAL RADIOLOGY | Facility: CLINIC | Age: 33
End: 2023-07-11
Attending: PHYSICIAN ASSISTANT
Payer: COMMERCIAL

## 2023-07-11 VITALS
WEIGHT: 315 LBS | TEMPERATURE: 98 F | OXYGEN SATURATION: 96 % | SYSTOLIC BLOOD PRESSURE: 127 MMHG | RESPIRATION RATE: 19 BRPM | BODY MASS INDEX: 41.75 KG/M2 | HEART RATE: 86 BPM | HEIGHT: 73 IN | DIASTOLIC BLOOD PRESSURE: 81 MMHG

## 2023-07-11 DIAGNOSIS — M25.572 PAIN IN JOINT, ANKLE AND FOOT, LEFT: ICD-10-CM

## 2023-07-11 DIAGNOSIS — M25.572 PAIN IN JOINT, ANKLE AND FOOT, LEFT: Primary | ICD-10-CM

## 2023-07-11 PROCEDURE — 73610 X-RAY EXAM OF ANKLE: CPT | Mod: TC | Performed by: RADIOLOGY

## 2023-07-11 PROCEDURE — 99213 OFFICE O/P EST LOW 20 MIN: CPT | Performed by: PHYSICIAN ASSISTANT

## 2023-07-11 NOTE — PROGRESS NOTES
"  Assessment & Plan     Pain in joint, ankle and foot, left  Likely sprain.  Follow up after xray.  Wear brace and good supportive shoes all the time.  Ice and elevate when resting   - XR Ankle Left G/E 3 Views; Future  - Ankle/Foot Bracing Supplies DME Ankle Brace; Left             BMI:   Estimated body mass index is 43.41 kg/m  as calculated from the following:    Height as of this encounter: 1.854 m (6' 1\").    Weight as of this encounter: 149.2 kg (329 lb).   Weight management plan: did not discuss        PAYAL Thompson [facility] May is a 33 year old, presenting for the following health issues:  Pain        7/11/2023     9:15 AM   Additional Questions   Roomed by Phylicia   Accompanied by self     Pain    History of Present Illness       Reason for visit:  Foot/lower leg pain,  Symptom onset:  1-2 weeks ago  Symptoms include:  Pain near the ankle and top of foot  Symptom intensity:  Moderate  Symptom progression:  Staying the same  Had these symptoms before:  No  What makes it worse:  Exercise can make it feel worse  What makes it better:  Elevating the foot seems to help reduce pain.    He eats 0-1 servings of fruits and vegetables daily.He consumes 1 sweetened beverage(s) daily.He exercises with enough effort to increase his heart rate 20 to 29 minutes per day.  He exercises with enough effort to increase his heart rate 4 days per week.   He is taking medications regularly.     Pain on inside of ankle.  Comes and goes.  No swelling or red.  Tried ibuprofen and it helped a little.              Review of Systems   As above      Objective    /81   Pulse 86   Temp 98  F (36.7  C) (Oral)   Resp 19   Ht 1.854 m (6' 1\")   Wt 149.2 kg (329 lb)   SpO2 96%   BMI 43.41 kg/m    Body mass index is 43.41 kg/m .  Physical Exam  Constitutional:       General: He is not in acute distress.     Appearance: He is obese.   Musculoskeletal:      Right ankle: " Normal.      Left ankle: No swelling. Tenderness present over the medial malleolus. Normal range of motion.   Neurological:      Mental Status: He is alert.                            DME (Durable Medical Equipment) Orders and Documentation  Orders Placed This Encounter   Procedures     Ankle/Foot Bracing Supplies DME Ankle Brace; Left      The patient was assessed and it was determined the patient is in need of the following listed DME Supplies/Equipment. Please complete supporting documentation below to demonstrate medical necessity.      Ankle/Foot Bracing Supplies Documentation  Patient requires the use of the ordered bracing device due to following medical need/condition: ankle pain

## 2023-08-02 ENCOUNTER — OFFICE VISIT (OUTPATIENT)
Dept: SLEEP MEDICINE | Facility: CLINIC | Age: 33
End: 2023-08-02
Attending: PHYSICIAN ASSISTANT
Payer: COMMERCIAL

## 2023-08-02 DIAGNOSIS — R06.83 SNORING: ICD-10-CM

## 2023-08-02 DIAGNOSIS — G47.30 OBSERVED SLEEP APNEA: ICD-10-CM

## 2023-08-02 DIAGNOSIS — G47.33 OSA (OBSTRUCTIVE SLEEP APNEA): Primary | ICD-10-CM

## 2023-08-02 PROCEDURE — G0399 HOME SLEEP TEST/TYPE 3 PORTA: HCPCS | Performed by: INTERNAL MEDICINE

## 2023-08-02 ASSESSMENT — SLEEP AND FATIGUE QUESTIONNAIRES
HOW LIKELY ARE YOU TO NOD OFF OR FALL ASLEEP WHILE LYING DOWN TO REST IN THE AFTERNOON WHEN CIRCUMSTANCES PERMIT: HIGH CHANCE OF DOZING
HOW LIKELY ARE YOU TO NOD OFF OR FALL ASLEEP WHILE SITTING QUIETLY AFTER LUNCH WITHOUT ALCOHOL: MODERATE CHANCE OF DOZING
HOW LIKELY ARE YOU TO NOD OFF OR FALL ASLEEP WHILE SITTING AND TALKING TO SOMEONE: SLIGHT CHANCE OF DOZING
HOW LIKELY ARE YOU TO NOD OFF OR FALL ASLEEP IN A CAR, WHILE STOPPED FOR A FEW MINUTES IN TRAFFIC: WOULD NEVER DOZE
HOW LIKELY ARE YOU TO NOD OFF OR FALL ASLEEP WHILE SITTING INACTIVE IN A PUBLIC PLACE: SLIGHT CHANCE OF DOZING
HOW LIKELY ARE YOU TO NOD OFF OR FALL ASLEEP WHEN YOU ARE A PASSENGER IN A CAR FOR AN HOUR WITHOUT A BREAK: SLIGHT CHANCE OF DOZING
HOW LIKELY ARE YOU TO NOD OFF OR FALL ASLEEP WHILE SITTING AND READING: MODERATE CHANCE OF DOZING
HOW LIKELY ARE YOU TO NOD OFF OR FALL ASLEEP WHILE WATCHING TV: SLIGHT CHANCE OF DOZING

## 2023-08-02 NOTE — PROGRESS NOTES
Pt is completing a home sleep test. Pt was instructed on how to put on the Noxturnal T3 device and associated equipment before going to bed and given the opportunity to practice putting it on before leaving the sleep center. Pt was reminded to bring the home sleep test kit back to the center tomorrow, at agreed upon time for download and reporting.   Neck circumference: 51 CM / 20 inches.  Carol Bauman CMA, HST Specialist  [place] / [facility]

## 2023-08-03 ENCOUNTER — DOCUMENTATION ONLY (OUTPATIENT)
Dept: SLEEP MEDICINE | Facility: CLINIC | Age: 33
End: 2023-08-03
Attending: PHYSICIAN ASSISTANT
Payer: COMMERCIAL

## 2023-08-03 NOTE — PROGRESS NOTES
This HSAT was performed using a Noxturnal T3 device which recorded snore, sound, movement activity, body position, nasal pressure, oronasal thermal airflow, pulse, oximetry and both chest and abdominal respiratory effort. HSAT data was restricted to the time patient states they were in bed.     HSAT was scored using 1B 4% hypopnea rule.     HST AHI (Non-PAT): 61.5  Snoring was reported as moderate.  Time with SpO2 below 89% was 147.9 minutes.   Overall signal quality was good     Pt will follow up with sleep provider to determine appropriate therapy.        HST POST-STUDY QUESTIONNAIRE    What time did you go to bed?  00:03  How long do you think it took to fall asleep?  20 min  What time did you wake up to start the day?  08:17  Did you get up during the night at all?  once  If you woke up, do you remember approximately what time(s)? No, but guess would be 04:30  Did you have any difficulty with the equipment?  No  Did you us any type of treatment with this study?  None  Was the head of the bed elevated? No  Did you sleep in a recliner?  No  Did you stop using CPAP at least 3 days before this test?  NA  Any other information you'd like us to know?      Carol Bauman [facility], HST Specialist  [place] / [facility]

## 2023-08-03 NOTE — PROGRESS NOTES
Pt returned HST device. It was downloaded and forwarded data to the clinical specialist for scoring.    Carol Bauman CMA, HST Specialist  [place] / [facility]

## 2023-08-04 ENCOUNTER — MYC MEDICAL ADVICE (OUTPATIENT)
Dept: SLEEP MEDICINE | Facility: CLINIC | Age: 33
End: 2023-08-04
Payer: COMMERCIAL

## 2023-08-04 PROBLEM — G47.33 OSA (OBSTRUCTIVE SLEEP APNEA): Status: ACTIVE | Noted: 2023-08-04

## 2023-08-04 NOTE — PROCEDURES
"HOME SLEEP STUDY INTERPRETATION        Patient: Lalo Hill  MRN: [id number]  YOB: 1990  Study Date: 8/2/2023  PCP/Referring Provider: Skip Josue;   Ordering Provider:   Bennett Goltz, PA-C       Indications for Home Study: Lalo Hill is a 33 year old male who presents with symptoms suggestive of obstructive sleep apnea.    Estimated body mass index is 43.41 kg/m  as calculated from the following:    Height as of 7/11/23: 1.854 m (6' 1\").    Weight as of 7/11/23: 149.2 kg (329 lb).  Total score - [place]: 11 (8/2/2023  1:08 PM)      Data: A full night home sleep study was performed recording the standard physiologic parameters including body position, movement, sound, nasal pressure, thermal oral airflow, chest and abdominal movements with respiratory inductance plethysmography, and oxygen saturation by pulse oximetry. Pulse rate was estimated by oximetry recording. This study was considered adequate based on > 4 hours of quality oximetry and respiratory recording. As specified by the AASM Manual for the Scoring of Sleep and Associated events, version 2.3, Rule VIII.D 1B, 4% oxygen desaturation scoring for hypopneas is used as a standard of care on all home sleep apnea testing.        Analysis Time:  492 minutes        Respiration:   Sleep Associated Hypoxemia: sustained hypoxemia was present. Baseline oxygen saturation was 95%.  Time with saturation less than or equal to 88% was 115.7 minutes. The lowest oxygen saturation was 70%.   Snoring: Snoring was present.  Respiratory events: The home study revealed a presence of 300 obstructive apneas and 1 mixed and central apneas. There were 203 hypopneas resulting in a combined apnea/hypopnea index [AHI] of 61.5 events per hour.  AHI was 90.8 per hour supine, N/A per hour prone, N/A per hour on left side, and 48.8 per hour on right side.   Pattern: Excluding events noted above, respiratory rate and pattern was Normal.      Position: Percent of " time spent: supine - 30.3%, prone - 0%, on left - 0%, on right - 69.5%.      Heart Rate: By pulse oximetry normal rate was noted.       Assessment:   Severe obstructive sleep apnea.  Sleep associated hypoxemia was present.    Recommendations:  CPAP therapy is recommended for treatment of severe obstructive sleep apnea.  Treatment can be initiated with auto titrating CPAP therapy with a pressure range of 5 to 15 cm H2O.  Patient should have appropriate clinical follow-up in 1 to 2 months after starting treatment to monitor response, compliance and CPAP download data.  Consider follow-up testing of oxygen saturations in sleep on treatment to monitor hypoxemia.  If CPAP is not successful, oral appliance therapy through dental dental sleep medicine or upper airway surgical evaluation through specialized ENT surgery are alternate options.  Suggest optimizing sleep hygiene and avoiding sleep deprivation.  Weight management.        Diagnosis Code(s): Obstructive Sleep Apnea G47.33, Hypoxemia G47.36    Glenn Kennedy MD, August 4, 2023   Diplomate, American Board of Psychiatry and Neurology, Sleep Medicine

## 2023-08-31 ENCOUNTER — DOCUMENTATION ONLY (OUTPATIENT)
Dept: SLEEP MEDICINE | Facility: CLINIC | Age: 33
End: 2023-08-31
Payer: COMMERCIAL

## 2023-08-31 DIAGNOSIS — G47.30 OBSERVED SLEEP APNEA: Primary | ICD-10-CM

## 2023-09-05 ENCOUNTER — DOCUMENTATION ONLY (OUTPATIENT)
Dept: SLEEP MEDICINE | Facility: CLINIC | Age: 33
End: 2023-09-05
Payer: COMMERCIAL

## 2023-09-05 NOTE — PROGRESS NOTES
3 day Sleep therapy management telephone visit    Diagnostic AHI:  61.5 HST    Confirmed with patient at time of call- Yes Patient is still interested in STM service       Subjective measures:  Patient reports things are going well with CPAP and denies any questions or concerns. Patient was given my contact information to call if they have any questions.         Order settings:  CPAP MIN CPAP MAX   5 cm H2O 15 cm H2O       Device settings:  CPAP MIN CPAP MAX EPR RESMED SOFT RESPONSE SETTING   5.0 cm  H20 15.0 cm  H20 TWO OFF       Compliance 100 %    Assessment: Nightly usage over four hours      Action plan: Patient to have 14 day STM visit. Patient has a follow up visit scheduled:   yes within 61-90 days of set up    Replacement device: No  STM ordered by provider: Yes     Total time spent on accessing and  interpreting remote patient PAP therapy data  10 minutes    Total time spent counseling, coaching  and reviewing PAP therapy data with patient  3 minutes    [id number] no

## 2023-09-15 ENCOUNTER — DOCUMENTATION ONLY (OUTPATIENT)
Dept: SLEEP MEDICINE | Facility: CLINIC | Age: 33
End: 2023-09-15
Payer: COMMERCIAL

## 2023-09-15 NOTE — PROGRESS NOTES
30 DAY STM VISIT    Diagnostic AHI:  61.5 HST    PAP settings:  CPAP MIN CPAP MAX 95TH % PRESSURE EPR RESMED SOFT RESPONSE SETTING   5.0 cm  H20 15.0 cm  H20 12.1 cm  H20  TWO OFF     Device type: Auto-CPAP  Mask type:  Per patient choice    Objective measures: 14 day rolling measures:    COMPLIANCE LEAK AHI AVERAGE USE IN MINUTES   100 % 24.17 1.43 435   GOAL >70% GOAL < 24 LPM GOAL <5 GOAL >240        Assessment:     Data only recheck   Action plan: pt to have 6 month STM visit  Patient has scheduled a follow up visit with Bennett Goltz, PA-C on 10/13/2023.     Total time spent on accessing and interpreting remote patient PAP therapy data  10 minutes    Total time spent counseling, coaching  and reviewing PAP therapy data with patient  0 minutes     [id number] this call  [id number] no  at 3 or 14 day [facility]

## 2023-09-29 ENCOUNTER — OFFICE VISIT (OUTPATIENT)
Dept: URGENT CARE | Facility: URGENT CARE | Age: 33
End: 2023-09-29
Payer: COMMERCIAL

## 2023-09-29 VITALS
OXYGEN SATURATION: 99 % | HEART RATE: 80 BPM | WEIGHT: 315 LBS | BODY MASS INDEX: 42.35 KG/M2 | RESPIRATION RATE: 18 BRPM | SYSTOLIC BLOOD PRESSURE: 152 MMHG | DIASTOLIC BLOOD PRESSURE: 90 MMHG | TEMPERATURE: 98.6 F

## 2023-09-29 DIAGNOSIS — M25.572 PAIN IN JOINT, ANKLE AND FOOT, LEFT: Primary | ICD-10-CM

## 2023-09-29 PROCEDURE — 99213 OFFICE O/P EST LOW 20 MIN: CPT | Performed by: PHYSICIAN ASSISTANT

## 2023-09-29 ASSESSMENT — PAIN SCALES - GENERAL: PAINLEVEL: MILD PAIN (2)

## 2023-09-29 NOTE — PROGRESS NOTES
Assessment & Plan     Pain in joint, ankle and foot, left  - Orthopedic  Referral; Future    Physical exam normal.  X-ray in 7/2023 normal, A1c normal.  Follow-up with orthopedics for further evaluation.    Return in about 2 weeks (around 10/13/2023) for consult with ortho.     PAYAL [facility]    Casey Hill is a 33 year old who presents for the following health issues     Patient presents with:  Urgent Care  Musculoskeletal Problem: Per patient has been having left ankle pain for a couple of months states he was seen in July 2023 for it had x-ray which showed nothing broken and given a brace states he is still having pain and stopped using the brace because he did not feel it helped but did not follow up, now at times he will have swelling, redness, and when he puts pressure states it does not feel normal. Per patient states he is having dull constant pain but at times it can be sharp.    AFSANEH Mya presents to clinic today for evaluation of pain in his left ankle.  Symptoms first began around 6 months ago, no injury.  He noticed some sharp stinging pain around his medial malleolus.  The pain would last for seconds and he describes it as intense.  This would sometimes happen while walking, other times at rest.  About a month ago, he noticed some dull constant pain in his lateral ankle.  He also states that his heel feels different on the left side than the right.  He describes as tingly, sharp and feeling like he is walking on a water pad.  He has tried elevating his foot, icing, wearing a brace.  Nothing has been helpful in controlling his symptoms.  He did sprain this ankle approximately 13 years ago but went many years without pain.  Of note, he states that in the last 9 months or so he has been increasing his activity, walking, biking and biking on a recumbent bike.    Review of Systems   ROS negative except as stated above.      Objective     BP (!) 152/90   Pulse 80   Temp 98.6  F (37  C) (Tympanic)   Resp 18   Wt 145.6 kg (321 lb)   SpO2 99%   BMI 42.35 kg/m    Physical Exam   GENERAL: healthy, alert and no distress  MS: no gross musculoskeletal defects noted, no edema. slightly decreased capillary refill in left foot.    No results found for any visits on 09/29/23.

## 2023-10-03 ENCOUNTER — TELEPHONE (OUTPATIENT)
Dept: ORTHOPEDICS | Facility: CLINIC | Age: 33
End: 2023-10-03

## 2023-10-03 NOTE — TELEPHONE ENCOUNTER
Talked with Lalo in regards to upcoming appointment for Friday at 10am with Dr. Jeong. Discussed nature of sx and pain and locations, and advised Lalo to follow up with a foot and ankle specialist with Dr. Parisi and Dr. Phan.  Lalo was in agreement of this plan and would like to go with podiatry and cancel with his upcoming appointment with Dr. Jeong.  Patient was given scheduling number and provider names and upcoming appointment with Dr. Jeong will be cancelled.  Frank Cornejo, TOMASA, ATC

## 2023-10-07 ENCOUNTER — NURSE TRIAGE (OUTPATIENT)
Dept: NURSING | Facility: CLINIC | Age: 33
End: 2023-10-07
Payer: COMMERCIAL

## 2023-10-07 NOTE — TELEPHONE ENCOUNTER
"S: Calf pain.    B: Symptoms:  Intermittent leg calf, thigh & hip pain  Rates pain mild-moderate (Tylenol 500 mg taken)  Calf tightness  Shin area sensitive to soft touch intermittent  While with stationary bike his left leg was getting more tired than his right  Denies swelling, fever. Took  his normal walk this morning left calf was tight at the start of walk then loosened  up.   Has not tried ice or heat to areas on leg     A: Per protocol to be seen with in 3 days at PCP office.  Transferred to scheduling to make an appointment,    R: Protocol and care advice reviewed. Patient verbalized understanding, in agreement with plan, and voiced no further questions. Call back with any new or worsening symptoms, concerns, or questions.    Analia Curiel RN, MA  [facility] Triage Nurse Advisor    Reason for Disposition   [1] MODERATE pain (e.g., interferes with normal activities, limping) AND [2] present > 3 days    Additional Information   Negative: Looks like a broken bone or dislocated joint (e.g., crooked or deformed)   Negative: Sounds like a life-threatening emergency to the triager   Negative: Chest pain   Negative: Difficulty breathing   Negative: Entire foot is cool or blue in comparison to other side   Negative: Unable to walk   Negative: [1] Red area or streak AND [2] fever   Negative: [1] Swollen joint AND [2] fever   Negative: [1] Cast on leg or ankle AND [2] now increased pain   Negative: Patient sounds very sick or weak to the triager   Negative: [1] SEVERE pain (e.g., excruciating, unable to do any normal activities) AND [2] not improved after 2 hours of pain medicine   Negative: [1] Thigh or calf pain AND [2] only 1 side AND [3] present > 1 hour (Exception: Chronic unchanged pain.)   Negative: History of prior \"blood clot\" in leg or lungs (i.e., deep vein thrombosis, pulmonary embolism)   Negative: History of inherited increased risk of blood clots (e.g., Factor 5 Leiden, Anti-thrombin 3, Protein C or " "Protein S deficiency, Prothrombin mutation)   Negative: Major surgery in past month   Negative: Hip or leg fracture (broken bone) in past month (or had cast on leg or ankle in past month)   Negative: Illness requiring prolonged bedrest in past month (e.g., immobilization, long hospital stay)   Negative: Long-distance travel in past month (e.g., car, bus, train, plane; with trip lasting 6 or more hours)   Negative: Cancer treatment in past six months (or has cancer now)   Negative: [1] Painful rash AND [2] multiple small blisters grouped together (i.e., dermatomal distribution or \"band\" or \"stripe\")   Negative: Looks like a boil, infected sore, deep ulcer or other infected rash (spreading redness, pus)   Negative: [1] Localized rash is very painful AND [2] no fever   Negative: Numbness in a leg or foot (i.e., loss of sensation)   Negative: Localized pain, redness or hard lump along vein   Negative: [1] Thigh, calf, or ankle swelling AND [2] bilateral AND [3] 1 side is more swollen   Negative: [1] Red area or streak AND [2] large (> 2 in. or 5 cm)   Negative: [1] Thigh, calf, or ankle swelling AND [2] only 1 side    Protocols used: Leg Pain-A-AH    "

## 2023-10-10 ENCOUNTER — OFFICE VISIT (OUTPATIENT)
Dept: FAMILY MEDICINE | Facility: CLINIC | Age: 33
End: 2023-10-10
Payer: COMMERCIAL

## 2023-10-10 VITALS
BODY MASS INDEX: 41.75 KG/M2 | RESPIRATION RATE: 20 BRPM | HEART RATE: 89 BPM | DIASTOLIC BLOOD PRESSURE: 68 MMHG | HEIGHT: 73 IN | OXYGEN SATURATION: 97 % | WEIGHT: 315 LBS | SYSTOLIC BLOOD PRESSURE: 120 MMHG | TEMPERATURE: 98.3 F

## 2023-10-10 DIAGNOSIS — M70.62 TROCHANTERIC BURSITIS OF LEFT HIP: Primary | ICD-10-CM

## 2023-10-10 DIAGNOSIS — M25.572 LEFT LATERAL ANKLE PAIN: ICD-10-CM

## 2023-10-10 PROCEDURE — 99213 OFFICE O/P EST LOW 20 MIN: CPT | Performed by: FAMILY MEDICINE

## 2023-10-10 ASSESSMENT — ENCOUNTER SYMPTOMS: LEG PAIN: 1

## 2023-10-10 ASSESSMENT — PAIN SCALES - GENERAL: PAINLEVEL: NO PAIN (0)

## 2023-10-10 NOTE — PROGRESS NOTES
Assessment & Plan     (M70.62) Trochanteric bursitis of left hip  (primary encounter diagnosis)  Comment: due to altered gait  Plan: Physical Therapy Referral        Refer to PT    (V01.419) Left lateral ankle pain  Comment: likely due to past ankle sprains  Plan: Physical Therapy Referral        Physical therapy ordered  Uncertain etiology of calf pain though could be due to ankle problem             See Patient Instructions    Fernanda Holland MD  [facility] BENY May is a 33 year old, presenting for the following health issues:  Leg Pain      10/10/2023     7:45 AM   Additional Questions   Roomed by Braydon       Leg Pain    History of Present Illness       Reason for visit:  Leg pain  Symptom onset:  3-4 weeks ago  Symptoms include:  Left upper and lower leg pain, hip pain, thigh pain, calf pain  Symptom intensity:  Mild  Symptom progression:  Worsening (left leg fatigues more quickly)  Had these symptoms before:  No  Prior treatment description:  Ultrasound to check for DVT  What makes it worse:  Has gotten worse with becoming more active  What makes it better:  Rest and elevation, acetaminophen    He eats 0-1 servings of fruits and vegetables daily.He consumes 1 sweetened beverage(s) daily.He exercises with enough effort to increase his heart rate 30 to 60 minutes per day.  He exercises with enough effort to increase his heart rate 4 days per week.   He is taking medications regularly.     C/o near constant left leg pain.  Calf is tighter than the right, lateral lower distal leg near ankle is painful most days, worse after walking.    Lateral left hip painful as well.    Rested x 1 week from activity, not much change.    With activity (walking) calf seems to loosen up then tightens again after activity completed    Today is here due to a session on indoor bike he noted his left leg getting fatigued faster than the right after about 5-10 min, this is new.  Usual sessions are  "about 20 min and does not notice the descrepancy between legs in past.              Review of Systems   Constitutional, HEENT, cardiovascular, pulmonary, gi and gu systems are negative, except as otherwise noted.      Objective    /68   Pulse 89   Temp 98.3  F (36.8  C) (Oral)   Resp 20   Ht 1.854 m (6' 1\")   Wt 143.1 kg (315 lb 6.4 oz)   SpO2 97%   BMI 41.61 kg/m    Body mass index is 41.61 kg/m .  Physical Exam   GENERAL: alert, no distress, and obese  EYES: Eyes grossly normal to inspection, PERRL and conjunctivae and sclerae normal  MS: no gross musculoskeletal defects noted, no edema, left ankle with mild ttp over lateral gutter, left calf without ttp and neg leonora, + ttp over left greater trochanter  SKIN: no suspicious lesions or rashes  PSYCH: mentation appears normal and anxious                      "

## 2023-10-12 ENCOUNTER — THERAPY VISIT (OUTPATIENT)
Dept: PHYSICAL THERAPY | Facility: CLINIC | Age: 33
End: 2023-10-12
Attending: FAMILY MEDICINE
Payer: COMMERCIAL

## 2023-10-12 DIAGNOSIS — M25.572 LEFT LATERAL ANKLE PAIN: ICD-10-CM

## 2023-10-12 DIAGNOSIS — M70.62 TROCHANTERIC BURSITIS OF LEFT HIP: ICD-10-CM

## 2023-10-12 PROCEDURE — 97161 PT EVAL LOW COMPLEX 20 MIN: CPT | Mod: GP

## 2023-10-12 PROCEDURE — 97140 MANUAL THERAPY 1/> REGIONS: CPT | Mod: GP

## 2023-10-12 ASSESSMENT — ACTIVITIES OF DAILY LIVING (ADL)
PUTTING ON SOCKS AND SHOES: SLIGHT DIFFICULTY
WALKING_UP_STEEP_HILLS: SLIGHT DIFFICULTY
GIVING WAY, BUCKLING OR SHIFTING OF KNEE: I DO NOT HAVE THE SYMPTOM
TWISTING/PIVOTING ON INVOLVED LEG: SLIGHT DIFFICULTY
WALKING_APPROXIMATELY_10_MINUTES: NO DIFFICULTY AT ALL
GO DOWN STAIRS: ACTIVITY IS MINIMALLY DIFFICULT
KNEE_ACTIVITY_OF_DAILY_LIVING_SCORE: 85.71
KNEE_ACTIVITY_OF_DAILY_LIVING_SUM: 60
HOW_WOULD_YOU_RATE_THE_OVERALL_FUNCTION_OF_YOUR_KNEE_DURING_YOUR_USUAL_DAILY_ACTIVITIES?: NEARLY NORMAL
HOW_WOULD_YOU_RATE_YOUR_CURRENT_LEVEL_OF_FUNCTION_DURING_YOUR_USUAL_ACTIVITIES_OF_DAILY_LIVING_FROM_0_TO_100_WITH_100_BEING_YOUR_LEVEL_OF_FUNCTION_PRIOR_TO_YOUR_HIP_PROBLEM_AND_0_BEING_THE_INABILITY_TO_PERFORM_ANY_OF_YOUR_USUAL_DAILY_ACTIVITIES?: 90
DEEP SQUATTING: SLIGHT DIFFICULTY
HOS_ADL_ITEM_SCORE_TOTAL: 54
GOING DOWN 1 FLIGHT OF STAIRS: NO DIFFICULTY AT ALL
LIMPING: I DO NOT HAVE THE SYMPTOM
WALKING_15_MINUTES_OR_GREATER: SLIGHT DIFFICULTY
AS_A_RESULT_OF_YOUR_KNEE_INJURY,_HOW_WOULD_YOU_RATE_YOUR_CURRENT_LEVEL_OF_DAILY_ACTIVITY?: NORMAL
STAND: ACTIVITY IS NOT DIFFICULT
STANDING FOR 15 MINUTES: SLIGHT DIFFICULTY
SIT WITH YOUR KNEE BENT: ACTIVITY IS MINIMALLY DIFFICULT
WEAKNESS: I DO NOT HAVE THE SYMPTOM
WALK: ACTIVITY IS MINIMALLY DIFFICULT
RECREATIONAL ACTIVITIES: SLIGHT DIFFICULTY
PAIN: THE SYMPTOM AFFECTS MY ACTIVITY SLIGHTLY
HOS_ADL_HIGHEST_POTENTIAL_SCORE: 64
SQUAT: ACTIVITY IS MINIMALLY DIFFICULT
SITTING FOR 15 MINUTES: NO DIFFICULTY AT ALL
RISE FROM A CHAIR: ACTIVITY IS NOT DIFFICULT
HEAVY_WORK: SLIGHT DIFFICULTY
GOING UP 1 FLIGHT OF STAIRS: NO DIFFICULTY AT ALL
STIFFNESS: THE SYMPTOM AFFECTS MY ACTIVITY SLIGHTLY
SWELLING: I DO NOT HAVE THE SYMPTOM
WALKING_DOWN_STEEP_HILLS: SLIGHT DIFFICULTY
WALKING_INITIALLY: NO DIFFICULTY AT ALL
HOW_WOULD_YOU_RATE_THE_CURRENT_FUNCTION_OF_YOUR_KNEE_DURING_YOUR_USUAL_DAILY_ACTIVITIES_ON_A_SCALE_FROM_0_TO_100_WITH_100_BEING_YOUR_LEVEL_OF_KNEE_FUNCTION_PRIOR_TO_YOUR_INJURY_AND_0_BEING_THE_INABILITY_TO_PERFORM_ANY_OF_YOUR_USUAL_DAILY_ACTIVITIES?: 90
KNEEL ON THE FRONT OF YOUR KNEE: ACTIVITY IS MINIMALLY DIFFICULT
GO UP STAIRS: ACTIVITY IS MINIMALLY DIFFICULT
GETTING INTO AND OUT OF AN AVERAGE CAR: SLIGHT DIFFICULTY
STEPPING UP AND DOWN CURBS: NO DIFFICULTY AT ALL
LIGHT_TO_MODERATE_WORK: SLIGHT DIFFICULTY
ROLLING OVER IN BED: NO DIFFICULTY AT ALL
RAW_SCORE: 60
HOS_ADL_SCORE(%): 84.38

## 2023-10-12 ASSESSMENT — SLEEP AND FATIGUE QUESTIONNAIRES

## 2023-10-12 NOTE — PROGRESS NOTES
PHYSICAL THERAPY EVALUATION  Type of Visit: Evaluation    See electronic medical record for Abuse and Falls Screening details.    Subjective   Patient reports he has been more active this past year with walking and biking. Symptoms in hip began about a year ago, calf cramping sensation and foot pain within the past few months. Pain fluctuates and is not consistent. Walking can cause pain immediately or take time to occur after completion of exercise, biking, standing, sitting all cause intermittent pain. Recently feels L leg is weaker than the R. Reports he has occasional low back pain.         Presenting condition or subjective complaint: Pain in hip, lower leg, and ankle  Date of onset: 10/10/23 (pt states began approx. 1 year ago.)    Relevant medical history: High blood pressure   Dates & types of surgery: None    Prior diagnostic imaging/testing results: X-ray     Prior therapy history for the same diagnosis, illness or injury: No      Living Environment  Social support: Alone   Type of home: House   Stairs to enter the home: No       Ramp: No   Stairs inside the home: Yes 23 Is there a railing: Yes   Help at home: None  Equipment owned:       Employment: Yes . In office has nice chair, not as supportive at home, has standing desk at home too.   Hobbies/Interests: Biking, walking, video games    Patient goals for therapy: Go on walks with less pain    Pain assessment: Pain present  See objective evaluation for additional pain details     Objective   LUMBAR SPINE EVALUATION  PAIN: Pain Location: L hip ache, lateral calf, lateral ankle. Some tenderness in low back.  Pain Quality: Aching L hip, cramping feeling in lateral calf and ankle  Pain Frequency: intermittent or changes throughout the day , mostly everyday.   Pain is Exacerbated By: After walking 20-30min (sometimes hurts after the walk, sometimes hurts during the walk), biking, standing for a long period of time, sitting for a long period  of time.  Pain is Relieved By: Stretching, elevation, Tylenol helps for a little bit  POSTURE: Standing Posture: Rounded shoulders, Forward head, Thoracic kyphosis increased  Sitting Posture: Rounded shoulders, Forward head, Thoracic kyphosis increased  ROM:  Flexion WNL, Extension limited 50%, R sideglide mildly limited > L, sidebend WNL B  MYOTOMES:  + L side L2, L4, S1, - L3, L5  NEURAL TENSION:  + slump L, - R. + SLR L, -R.   FUNCTIONAL TESTS:  Did not trial squat as patient says squat is difficult d/t chronic L knee pain  PALPATION:  Tender L2, L4, L5. Reduced PA mobility L3-S1    Assessment & Plan   CLINICAL IMPRESSIONS  Medical Diagnosis: Trochanteric bursitis of left hip, Left lateral ankle pain    Treatment Diagnosis: Low back mobility deficits with radiating pain   Impression/Assessment: Patient is a 33 year old male with L hip, calf, and ankle pain complaints.  The following significant findings have been identified: Pain, Decreased ROM/flexibility, Decreased joint mobility, Impaired muscle performance, and Impaired posture. These impairments interfere with their ability to perform recreational activities and community mobility as compared to previous level of function.     Clinical Decision Making (Complexity):  Clinical Presentation: Stable/Uncomplicated  Clinical Presentation Rationale: based on medical and personal factors listed in PT evaluation  Clinical Decision Making (Complexity): Low complexity    PLAN OF CARE  Treatment Interventions:  Interventions: Manual Therapy, Therapeutic Exercise    Long Term Goals     PT Goal 1  Goal Identifier: Walking  Goal Description: Patient will be able to walk 30 minutes without any pain during or after walk.  Rationale: to maximize safety and independence within the community  Target Date: 11/23/23      Frequency of Treatment: 1x/week  Duration of Treatment: 6 weeks    Recommended Referrals to Other Professionals:  none  Education Assessment:   Learner/Method:  Patient    Risks and benefits of evaluation/treatment have been explained.   Patient/Family/caregiver agrees with Plan of Care.     Evaluation Time:     PT Jose, Low Complexity Minutes (18700): 25       Signing Clinician: Selma Cornelius PT and Saadia Delgado, SPT

## 2023-10-12 NOTE — PROGRESS NOTES
Virtual Visit Details    Type of service:  Video Visit   Start Time: 2:02 PM   End Time: 2:35 PM    Originating Location (pt. Location): Home    Distant Location (provider location):  Off-site  Platform used for Video Visit: [facility]    CPAP Follow-Up Visit:    Date on this visit: 10/13/2023    Lalo Hill has a follow-up visit today to review his CPAP use for JOSE. S/He was initially seen for loud snoring, observed apnea, unrefreshing sleep.     Previous Study Results:   Date: 8/2/23.  Weight 329 pounds.  AHI: 61.5/hr. O2 luz marina 70%. 115.7 minutes below 89%.      ResMed   Auto-PAP 5.0 - 15.0 cmH2O 30 day usage data:    100% of days with > 4 hours of use. 0/30 days with no use.   Average use 422 minutes per day.   95%ile Leak 33.33 L/min. Leak has been better in the last week.  CPAP 95% pressure 11.4 cm.   AHI 0.87 events per hour.       He is using CPAP nightly. He has not had many issues. He has had occasional nights with mask leak. He did feel a lot more energy the first few days and had a little trouble getting to sleep. That has worked itself out.      No specialty comments available.    Do you use a CPAP Machine at home: Yes  Overall, on a scale of 0-10 how would you rate your CPAP (0 poor, 10 great): 9    What type of mask do you use: Full Face Mask. AirFit F20. He feels he does breathe through his mouth. Sometimes he feels he can't get enough air through his nose when going to sleep, but sometimes he is breathing through his nose when he wakes.   Is your mask comfortable: Yes  If not, why:    How often do you replace supplies: Just started so did not replace anything yet.     Is your mask leaking: No  If yes, where do you feel it:    How many night per week does the mask leak (0-7):      Do you notice snoring with mask on: No  Do you notice gasping arousals with mask on: No  Are you having significant oral or nasal dryness: Yes- just in the last couple of days.  Are you using the humidifier: yes  Does the  water chamber run out before the night is over:a couple of times.  Do you get condensation in the mask or hose:no  Is the pressure setting comfortable: No  If not, why: Unsure. A couple of days, he feels like his chest feels tighter. That was a couple weeks after starting and a couple of times last week. This week was pretty good.      What is your typical bedtime: 12:30am-1:00am  How long does it take you to go to sleep on PAP therapy: 10 min  What time do you typically get out of bed for the day: 8:30am-9:00am  Wakes 0-1 times per night for 5 minutes. Reason for waking: mask leak, rarely restroom  How many hours on average per night are you using PAP therapy: 7  How many hours are you sleeping per night: 7  Do you feel well rested in the morning: Yes    Naps: 0 -no need.       Weight change since sleep study: 316 lbs      Past medical/surgical history, family history, social history, medications and allergies were reviewed.      Problem List:  Patient Active Problem List    Diagnosis Date Noted    JOSE (obstructive sleep apnea) 08/04/2023     Priority: Medium     Severe JOSE      SVT (supraventricular tachycardia) 09/26/2022     Priority: Medium    Bicuspid aortic valve 11/15/2017     Priority: Medium    Nonintractable headache, unspecified chronicity pattern, unspecified headache type 11/15/2017     Priority: Medium    BMI 36.0-36.9,adult 11/15/2017     Priority: Medium    CARDIOVASCULAR SCREENING; LDL GOAL LESS THAN 160 11/26/2014     Priority: Medium    Drusen (degenerative) of retina 11/26/2014     Priority: Medium    Elevated blood pressure reading without diagnosis of hypertension 11/26/2014     Priority: Medium        Impression/Plan:    (G47.33) JOSE (obstructive sleep apnea)  (primary encounter diagnosis), (G47.34) Nocturnal hypoxemia  Comment: Lalo had severe JOSE, AHI 61/hr with 115 minutes below 89%. There were portions of the test where his O2 baseline was sitting below 90%. He is using CPAP regularly  and benefits from use. He has better sleep and energy. His download looks good, apnea well controlled. His leak is a bit high lately.  He is comfortable with the pressures and does not feel the need for a narrowing of the range.  Plan: HST - Home Sleep Apnea Test  - WatchPat  NonReturnable        Continue auto CPAP 5-15 cm.  We reviewed recommendations for cleaning and replacing supplies. I encouraged him to pay close attention to the mask fit and schedule a mask fitting appointment with [facility] if still having leak.  WatchPAT One home sleep study on CPAP to assess for residual hypoxemia.        He will follow up with me in about 1 year(s). He was advised to contact me once he does the sleep study so I can respond with the results.    38 minutes were spent on the date of the encounter doing chart review, history and exam, documentation and further activities as noted above.     Bennett Goltz, PA-C    CC: No ref. provider found

## 2023-10-13 ENCOUNTER — VIRTUAL VISIT (OUTPATIENT)
Dept: SLEEP MEDICINE | Facility: CLINIC | Age: 33
End: 2023-10-13
Payer: COMMERCIAL

## 2023-10-13 VITALS — HEIGHT: 73 IN | WEIGHT: 315 LBS | BODY MASS INDEX: 41.75 KG/M2

## 2023-10-13 DIAGNOSIS — G47.33 OSA (OBSTRUCTIVE SLEEP APNEA): Primary | ICD-10-CM

## 2023-10-13 DIAGNOSIS — G47.34 NOCTURNAL HYPOXEMIA: ICD-10-CM

## 2023-10-13 DIAGNOSIS — I10 BENIGN ESSENTIAL HYPERTENSION: ICD-10-CM

## 2023-10-13 PROCEDURE — 99214 OFFICE O/P EST MOD 30 MIN: CPT | Mod: VID | Performed by: PHYSICIAN ASSISTANT

## 2023-10-13 RX ORDER — LISINOPRIL/HYDROCHLOROTHIAZIDE 10-12.5 MG
1 TABLET ORAL DAILY
Qty: 90 TABLET | Refills: 1 | Status: SHIPPED | OUTPATIENT
Start: 2023-10-13 | End: 2024-04-07

## 2023-10-13 ASSESSMENT — PAIN SCALES - GENERAL: PAINLEVEL: NO PAIN (0)

## 2023-10-13 NOTE — PATIENT INSTRUCTIONS
MY TREATMENT INFORMATION FOR SLEEP APNEA-  Lalo Hill    DOCTOR : Bennett Goltz, PA-C    Am I having a home sleep study?  --->Watch the video for the device you are using:    -Disposable device sent out require phone/computer application-   https://[email or web address].com/watch?[email or web address]

## 2023-10-13 NOTE — NURSING NOTE
No other vitals to report today, taken at an appt the other day per pt    Has patient had flu shot for current/most recent flu season? If so, when? No, 9/26/22      Is the patient currently in the state of [place]? YES    Visit mode:VIDEO    If the visit is dropped, the patient can be reconnected by: VIDEO VISIT: Text to cell phone:   Telephone Information:   Mobile [phone]       Will anyone else be joining the visit? NO  (If patient encounters technical issues they should call [phone]    How would you like to obtain your AVS? MyChart    Are changes needed to the allergy or medication list? No    Reason for visit: CAPRICE Shelley MA VVF

## 2023-10-17 PROBLEM — E66.01 CLASS 3 SEVERE OBESITY DUE TO EXCESS CALORIES WITH SERIOUS COMORBIDITY AND BODY MASS INDEX (BMI) OF 40.0 TO 44.9 IN ADULT (H): Status: ACTIVE | Noted: 2017-11-15

## 2023-10-17 PROBLEM — E66.813 CLASS 3 SEVERE OBESITY DUE TO EXCESS CALORIES WITH SERIOUS COMORBIDITY AND BODY MASS INDEX (BMI) OF 40.0 TO 44.9 IN ADULT (H): Status: ACTIVE | Noted: 2017-11-15

## 2023-10-17 NOTE — PATIENT INSTRUCTIONS
Schedule Physical Therapy.    Follow-up with primary as scheduled.    Ok to continue exercising.

## 2023-10-19 ASSESSMENT — ENCOUNTER SYMPTOMS
PARESTHESIAS: 1
EYE PAIN: 0
WEAKNESS: 0
DIARRHEA: 0
COUGH: 0
JOINT SWELLING: 0
HEMATURIA: 0
NAUSEA: 0
FREQUENCY: 0
HEMATOCHEZIA: 0
DYSURIA: 0
PALPITATIONS: 0
FEVER: 0
SHORTNESS OF BREATH: 0
HEARTBURN: 0
HEADACHES: 1
ABDOMINAL PAIN: 0
NERVOUS/ANXIOUS: 0
CHILLS: 0
SORE THROAT: 0
ARTHRALGIAS: 0
DIZZINESS: 0
CONSTIPATION: 0
MYALGIAS: 0

## 2023-10-20 ENCOUNTER — OFFICE VISIT (OUTPATIENT)
Dept: FAMILY MEDICINE | Facility: CLINIC | Age: 33
End: 2023-10-20
Payer: COMMERCIAL

## 2023-10-20 VITALS
WEIGHT: 315 LBS | BODY MASS INDEX: 41.75 KG/M2 | OXYGEN SATURATION: 97 % | SYSTOLIC BLOOD PRESSURE: 136 MMHG | TEMPERATURE: 97.7 F | RESPIRATION RATE: 16 BRPM | DIASTOLIC BLOOD PRESSURE: 82 MMHG | HEIGHT: 73 IN | HEART RATE: 81 BPM

## 2023-10-20 DIAGNOSIS — I47.10 SVT (SUPRAVENTRICULAR TACHYCARDIA) (H): ICD-10-CM

## 2023-10-20 DIAGNOSIS — Z00.00 ROUTINE GENERAL MEDICAL EXAMINATION AT A HEALTH CARE FACILITY: Primary | ICD-10-CM

## 2023-10-20 LAB
ALBUMIN SERPL BCG-MCNC: 4.6 G/DL (ref 3.5–5.2)
ALP SERPL-CCNC: 80 U/L (ref 40–129)
ALT SERPL W P-5'-P-CCNC: 31 U/L (ref 0–70)
ANION GAP SERPL CALCULATED.3IONS-SCNC: 12 MMOL/L (ref 7–15)
AST SERPL W P-5'-P-CCNC: 32 U/L (ref 0–45)
BILIRUB SERPL-MCNC: 0.6 MG/DL
BUN SERPL-MCNC: 10.3 MG/DL (ref 6–20)
CALCIUM SERPL-MCNC: 9.6 MG/DL (ref 8.6–10)
CHLORIDE SERPL-SCNC: 102 MMOL/L (ref 98–107)
CHOLEST SERPL-MCNC: 186 MG/DL
CREAT SERPL-MCNC: 0.88 MG/DL (ref 0.67–1.17)
DEPRECATED HCO3 PLAS-SCNC: 25 MMOL/L (ref 22–29)
EGFRCR SERPLBLD CKD-EPI 2021: >90 ML/MIN/1.73M2
GLUCOSE SERPL-MCNC: 94 MG/DL (ref 70–99)
HBA1C MFR BLD: 5.2 % (ref 0–5.6)
HDLC SERPL-MCNC: 39 MG/DL
LDLC SERPL CALC-MCNC: 129 MG/DL
NONHDLC SERPL-MCNC: 147 MG/DL
POTASSIUM SERPL-SCNC: 3.9 MMOL/L (ref 3.4–5.3)
PROT SERPL-MCNC: 7.8 G/DL (ref 6.4–8.3)
SODIUM SERPL-SCNC: 139 MMOL/L (ref 135–145)
TRIGL SERPL-MCNC: 90 MG/DL

## 2023-10-20 PROCEDURE — 80053 COMPREHEN METABOLIC PANEL: CPT | Performed by: FAMILY MEDICINE

## 2023-10-20 PROCEDURE — 83036 HEMOGLOBIN GLYCOSYLATED A1C: CPT | Performed by: FAMILY MEDICINE

## 2023-10-20 PROCEDURE — 80061 LIPID PANEL: CPT | Performed by: FAMILY MEDICINE

## 2023-10-20 PROCEDURE — 90471 IMMUNIZATION ADMIN: CPT | Performed by: FAMILY MEDICINE

## 2023-10-20 PROCEDURE — 90686 IIV4 VACC NO PRSV 0.5 ML IM: CPT | Performed by: FAMILY MEDICINE

## 2023-10-20 PROCEDURE — 99395 PREV VISIT EST AGE 18-39: CPT | Mod: 25 | Performed by: FAMILY MEDICINE

## 2023-10-20 PROCEDURE — 36415 COLL VENOUS BLD VENIPUNCTURE: CPT | Performed by: FAMILY MEDICINE

## 2023-10-20 PROCEDURE — 90480 ADMN SARSCOV2 VAC 1/ONLY CMP: CPT | Performed by: FAMILY MEDICINE

## 2023-10-20 PROCEDURE — 91320 SARSCV2 VAC 30MCG TRS-SUC IM: CPT | Performed by: FAMILY MEDICINE

## 2023-10-20 ASSESSMENT — ENCOUNTER SYMPTOMS
JOINT SWELLING: 0
NAUSEA: 0
WEAKNESS: 0
HEMATOCHEZIA: 0
EYE PAIN: 0
DIARRHEA: 0
ARTHRALGIAS: 0
HEARTBURN: 0
FREQUENCY: 0
HEADACHES: 1
COUGH: 0
SORE THROAT: 0
PALPITATIONS: 0
PARESTHESIAS: 1
FEVER: 0
DYSURIA: 0
HEMATURIA: 0
MYALGIAS: 0
DIZZINESS: 0
ABDOMINAL PAIN: 0
CHILLS: 0
SHORTNESS OF BREATH: 0
CONSTIPATION: 0
NERVOUS/ANXIOUS: 0

## 2023-10-20 ASSESSMENT — PAIN SCALES - GENERAL: PAINLEVEL: NO PAIN (0)

## 2023-10-20 NOTE — PROGRESS NOTES
SUBJECTIVE:   CC: Lalo is an 33 year old who presents for preventative health visit.       Healthy Habits:     Getting at least 3 servings of Calcium per day:  NO    Bi-annual eye exam:  Yes    Dental care twice a year:  Yes    Sleep apnea or symptoms of sleep apnea:  Sleep apnea    Diet:  Regular (no restrictions)    Frequency of exercise:  2-3 days/week    Duration of exercise:  30-45 minutes    Taking medications regularly:  Yes    Medication side effects:  Lightheadedness    Additional concerns today:  Yes    Wt Readings from Last 4 Encounters:   10/20/23 144.5 kg (318 lb 9.6 oz)   10/13/23 143.3 kg (316 lb)   10/10/23 143.1 kg (315 lb 6.4 oz)   09/29/23 145.6 kg (321 lb)     - right side pain - right side chest wall   Preventive -     Immunization History   Administered Date(s) Administered    COVID-19 Bivalent 18+ (Moderna) 09/26/2022    COVID-19 Monovalent 18+ (Moderna) 04/06/2021, 05/04/2021, 01/26/2022    DTAP (<7y) 1991, 1991, 06/17/1992, 08/09/1992, 04/07/1995    FLU 6-35 months 11/11/2006, 10/27/2007, 11/20/2008    HEPA 09/15/2008, 08/03/2009    HEPATITIS A (PEDS 12M-18Y) 09/15/2008, 08/03/2009    HIB (PRP-T) 1991, 1991, 06/17/1992    HepB 08/01/1996, 09/16/1996, 04/01/1997    Hepatitis A (ADULT 19+) 09/15/2008, 08/03/2009    Hepatitis B, Adult 09/16/1996, 04/01/1997    Hepatitis B, Peds 08/01/1996, 09/16/1996, 04/01/1997    Hib, Unspecified 1991, 1991, 06/17/1992    Influenza (IIV3) PF 10/26/2002, 12/11/2003, 12/28/2004, 11/12/2005    Influenza Vaccine >6 months (Alfuria,Fluzone) 11/15/2017, 01/24/2019, 01/26/2022, 09/26/2022    MMR 02/26/1992, 08/05/1997    Meningococcal ACWY (Menactra ) 08/03/2009    Meningococcal Mcv4 Conjugate,unspecified  08/03/2009    Poliovirus, inactivated (IPV) 1991, 1991, 1991, 04/07/1995    TD,PF 7+ (Tenivac) 02/17/2003    TDAP Vaccine (Adacel) 08/24/2017         - Colon CA screen: Colonoscopy, age 45-75 every 10 years  "or FIT every year or Cologuard every 3 years   No fam hx of colon cancer       -lipids screen: ordered     Diabetes screen: ordered       Social History     Tobacco Use    Smoking status: Never    Smokeless tobacco: Never   Substance Use Topics    Alcohol use: Yes     Alcohol/week: 2.0 standard drinks of alcohol     Types: 2 Cans of beer per week     Comment: rare, not even monthly             10/19/2023     9:41 PM   Alcohol Use   Prescreen: >3 drinks/day or >7 drinks/week? No          No data to display                Last PSA: No results found for: \"PSA\"    Reviewed orders with patient. Reviewed health maintenance and updated orders accordingly - Yes  Lab work is in process  Labs reviewed in EPIC  BP Readings from Last 3 Encounters:   10/20/23 136/82   10/10/23 120/68   09/29/23 (!) 152/90    Wt Readings from Last 3 Encounters:   10/20/23 144.5 kg (318 lb 9.6 oz)   10/13/23 143.3 kg (316 lb)   10/10/23 143.1 kg (315 lb 6.4 oz)                  Patient Active Problem List   Diagnosis    CARDIOVASCULAR SCREENING; LDL GOAL LESS THAN 160    Drusen (degenerative) of retina    Elevated blood pressure reading without diagnosis of hypertension    Bicuspid aortic valve    Nonintractable headache, unspecified chronicity pattern, unspecified headache type    Class 3 severe obesity due to excess calories with serious comorbidity and body mass index (BMI) of 40.0 to 44.9 in adult (H)    SVT (supraventricular tachycardia)    JOSE (obstructive sleep apnea)    Nocturnal hypoxemia     No past surgical history on file.    Social History     Tobacco Use    Smoking status: Never    Smokeless tobacco: Never   Substance Use Topics    Alcohol use: Yes     Alcohol/week: 2.0 standard drinks of alcohol     Types: 2 Cans of beer per week     Comment: rare, not even monthly     Family History   Problem Relation Age of Onset    Hypertension Father     Cancer Maternal Grandmother     C.A.D. Maternal Grandfather     Cerebrovascular Disease " Maternal Grandfather     Prostate Cancer Maternal Grandfather     C.A.D. Paternal Grandmother     Blood Disease Paternal Grandmother     C.A.D. Paternal Grandfather     Cerebrovascular Disease Paternal Grandfather     Hypertension Paternal Grandfather     Prostate Cancer Paternal Grandfather          Current Outpatient Medications   Medication Sig Dispense Refill    lisinopril-hydrochlorothiazide (ZESTORETIC) 10-12.5 MG tablet Take 1 tablet by mouth daily 90 tablet 1    metoprolol succinate ER (TOPROL XL) 100 MG 24 hr tablet        Allergies   Allergen Reactions    Erythro [Erythromycin] Rash    Erythromycin Rash     Patient unsure - reaction occurred in a child     Recent Labs   Lab Test 10/20/23  0842 05/04/23  0955 11/15/17  0836   A1C 5.2 5.3  --    LDL  --  113* 101*   HDL  --  41 46   TRIG  --  112 88   ALT  --  55  --    CR  --  0.96 0.89   GFRESTIMATED  --  >90 >90   GFRESTBLACK  --   --  >90   POTASSIUM  --  3.8 3.8        Reviewed and updated as needed this visit by clinical staff   Tobacco  Allergies  Meds              Reviewed and updated as needed this visit by Provider                 Past Medical History:   Diagnosis Date    * * * SBE PROPHYLAXIS * * *     Bicuspid aortic valve/needs prophylaxis for dental procedures    Hypertension     SVT (supraventricular tachycardia)     SVT (supraventricular tachycardia) 9/26/2022      No past surgical history on file.    Review of Systems   Constitutional:  Negative for chills and fever.   HENT:  Negative for congestion, ear pain, hearing loss and sore throat.    Eyes:  Negative for pain and visual disturbance.   Respiratory:  Negative for cough and shortness of breath.    Cardiovascular:  Positive for chest pain. Negative for palpitations and peripheral edema.   Gastrointestinal:  Negative for abdominal pain, constipation, diarrhea, heartburn, hematochezia and nausea.   Genitourinary:  Negative for dysuria, frequency, genital sores, hematuria, impotence,  "penile discharge and urgency.   Musculoskeletal:  Negative for arthralgias, joint swelling and myalgias.   Skin:  Negative for rash.   Neurological:  Positive for headaches and paresthesias. Negative for dizziness and weakness.   Psychiatric/Behavioral:  Negative for mood changes. The patient is not nervous/anxious.          OBJECTIVE:   /82   Pulse 81   Temp 97.7  F (36.5  C) (Tympanic)   Resp 16   Ht 1.854 m (6' 1\")   Wt 144.5 kg (318 lb 9.6 oz)   SpO2 97%   BMI 42.03 kg/m      Physical Exam  GENERAL: healthy, alert and no distress  EYES: Eyes grossly normal to inspection, PERRL and conjunctivae and sclerae normal  HENT: ear canals and TM's normal, nose and mouth without ulcers or lesions  NECK: no adenopathy, no asymmetry, masses, or scars and thyroid normal to palpation  RESP: lungs clear to auscultation - no rales, rhonchi or wheezes  CV: regular rate and rhythm, normal S1 S2, no S3 or S4, no murmur, click or rub, no peripheral edema and peripheral pulses strong  ABDOMEN: soft, nontender, no hepatosplenomegaly, no masses and bowel sounds normal  MS: no gross musculoskeletal defects noted, no edema  SKIN: no suspicious lesions or rashes  NEURO: Normal strength and tone, mentation intact and speech normal  PSYCH: mentation appears normal, affect normal/bright        ASSESSMENT/PLAN:   (Z00.00) Routine general medical examination at a health care facility  (primary encounter diagnosis)  Comment: Preventive care reviewed and updated.    Plan: Comprehensive metabolic panel, Lipid panel         reflex to direct LDL Fasting, Hemoglobin A1c           (I47.10) SVT (supraventricular tachycardia)  Stable , no current symptoms   Continue metoprolol    Patient has been advised of split billing requirements and indicates understanding: Yes      COUNSELING:   Reviewed preventive health counseling, as reflected in patient instructions       Regular exercise       Healthy diet/nutrition       " Immunizations  Vaccinated for: Covid-19 and Influenza          He reports that he has never smoked. He has never used smokeless tobacco.            Eliza Uriarte MD  [facility]

## 2023-10-25 ENCOUNTER — THERAPY VISIT (OUTPATIENT)
Dept: PHYSICAL THERAPY | Facility: CLINIC | Age: 33
End: 2023-10-25
Attending: FAMILY MEDICINE
Payer: COMMERCIAL

## 2023-10-25 DIAGNOSIS — M70.62 TROCHANTERIC BURSITIS OF LEFT HIP: Primary | ICD-10-CM

## 2023-10-25 PROCEDURE — 97110 THERAPEUTIC EXERCISES: CPT | Mod: GP

## 2023-10-25 PROCEDURE — 97140 MANUAL THERAPY 1/> REGIONS: CPT | Mod: GP

## 2023-10-26 ENCOUNTER — MYC MEDICAL ADVICE (OUTPATIENT)
Dept: FAMILY MEDICINE | Facility: CLINIC | Age: 33
End: 2023-10-26
Payer: COMMERCIAL

## 2023-11-01 ENCOUNTER — THERAPY VISIT (OUTPATIENT)
Dept: PHYSICAL THERAPY | Facility: CLINIC | Age: 33
End: 2023-11-01
Attending: FAMILY MEDICINE
Payer: COMMERCIAL

## 2023-11-01 DIAGNOSIS — M70.62 TROCHANTERIC BURSITIS OF LEFT HIP: Primary | ICD-10-CM

## 2023-11-01 PROCEDURE — 97530 THERAPEUTIC ACTIVITIES: CPT | Mod: GP | Performed by: PHYSICAL THERAPIST

## 2023-11-01 PROCEDURE — 97110 THERAPEUTIC EXERCISES: CPT | Mod: 59 | Performed by: PHYSICAL THERAPIST

## 2023-11-06 NOTE — NURSING NOTE
Writer scheduled patient for Watchpat mail out & sent [facility] message regarding home sleep test.Mychart follow up will be with [employer].

## 2023-11-07 ENCOUNTER — THERAPY VISIT (OUTPATIENT)
Dept: PHYSICAL THERAPY | Facility: CLINIC | Age: 33
End: 2023-11-07
Payer: COMMERCIAL

## 2023-11-07 DIAGNOSIS — M70.62 TROCHANTERIC BURSITIS OF LEFT HIP: Primary | ICD-10-CM

## 2023-11-07 PROCEDURE — 97110 THERAPEUTIC EXERCISES: CPT | Mod: GP

## 2023-11-22 ENCOUNTER — THERAPY VISIT (OUTPATIENT)
Dept: PHYSICAL THERAPY | Facility: CLINIC | Age: 33
End: 2023-11-22
Payer: COMMERCIAL

## 2023-11-22 DIAGNOSIS — M70.62 TROCHANTERIC BURSITIS OF LEFT HIP: Primary | ICD-10-CM

## 2023-11-22 PROCEDURE — 97110 THERAPEUTIC EXERCISES: CPT | Mod: GP | Performed by: PHYSICAL THERAPIST

## 2023-11-28 ENCOUNTER — OFFICE VISIT (OUTPATIENT)
Dept: PODIATRY | Facility: CLINIC | Age: 33
End: 2023-11-28
Attending: PHYSICIAN ASSISTANT
Payer: COMMERCIAL

## 2023-11-28 VITALS — SYSTOLIC BLOOD PRESSURE: 135 MMHG | HEART RATE: 85 BPM | DIASTOLIC BLOOD PRESSURE: 85 MMHG

## 2023-11-28 DIAGNOSIS — M21.6X2 PRONATION OF BOTH FEET: Primary | ICD-10-CM

## 2023-11-28 DIAGNOSIS — M76.822 POSTERIOR TIBIAL TENDONITIS, LEFT: ICD-10-CM

## 2023-11-28 DIAGNOSIS — M21.6X1 PRONATION OF BOTH FEET: Primary | ICD-10-CM

## 2023-11-28 PROCEDURE — 99203 OFFICE O/P NEW LOW 30 MIN: CPT | Performed by: PODIATRIST

## 2023-11-28 NOTE — PROGRESS NOTES
S:  patient seen today in consult from Candis Sanon and complains of left foot pain.  Points to posterior tibial tendon where is courses around the medial malleoli.  Has had this for 6 months.  Describes it as a burning pain.  Aggrevated by activity and relieved by rest.  Patient has been more active over the last year.  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  Denies edema erythema ecchymosis numbness or weakness.    ROS: See above       Allergies   Allergen Reactions    Erythro [Erythromycin] Rash    Erythromycin Rash     Patient unsure - reaction occurred in a child       Current Outpatient Medications   Medication Sig Dispense Refill    lisinopril-hydrochlorothiazide (ZESTORETIC) 10-12.5 MG tablet Take 1 tablet by mouth daily 90 tablet 1    metoprolol succinate ER (TOPROL XL) 100 MG 24 hr tablet          Patient Active Problem List   Diagnosis    CARDIOVASCULAR SCREENING; LDL GOAL LESS THAN 160    Drusen (degenerative) of retina    Elevated blood pressure reading without diagnosis of hypertension    Bicuspid aortic valve    Nonintractable headache, unspecified chronicity pattern, unspecified headache type    Class 3 severe obesity due to excess calories with serious comorbidity and body mass index (BMI) of 40.0 to 44.9 in adult (H)    SVT (supraventricular tachycardia)    JOSE (obstructive sleep apnea)    Nocturnal hypoxemia    Trochanteric bursitis of left hip       Past Medical History:   Diagnosis Date    * * * SBE PROPHYLAXIS * * *     Bicuspid aortic valve/needs prophylaxis for dental procedures    Hypertension     SVT (supraventricular tachycardia)     SVT (supraventricular tachycardia) 9/26/2022       No past surgical history on file.    Family History   Problem Relation Age of Onset    Hypertension Father     Cancer Maternal Grandmother     C.A.D. Maternal Grandfather     Cerebrovascular Disease Maternal Grandfather     Prostate Cancer Maternal  Grandfather     TREASURE. Paternal Grandmother     Blood Disease Paternal Grandmother     TREASURE. Paternal Grandfather     Cerebrovascular Disease Paternal Grandfather     Hypertension Paternal Grandfather     Prostate Cancer Paternal Grandfather        Social History     Tobacco Use    Smoking status: Never    Smokeless tobacco: Never   Substance Use Topics    Alcohol use: Yes     Alcohol/week: 2.0 standard drinks of alcohol     Types: 2 Cans of beer per week     Comment: rare, not even monthly         Exam:  Vitals: /85   Pulse 85   BMI: There is no height or weight on file to calculate BMI.  Height: Data Unavailable    Constitutional/ general:  Pt is in no apparent distress, appears well-nourished.  Cooperative with history and physical exam.     Psych:  The patient answered questions appropriately.  Normal affect.  Seems to have reasonable expectations, in terms of treatment.     Lungs:  Non labored breathing, non labored speech. No cough.  No audible wheezing. Even, quiet breathing.       Vascular:  Pedal pulses are palpable bilaterally for both the DP and PT arteries.  CFT < 3 sec.  No edema.  No varicosities.  Pedal hair growth noted.     Neuro:  Alert and oriented x 3. Coordinated gait.  Light touch sensation is intact  Derm: Normal texture and turgor.  No erythema, ecchymosis, or cyanosis.  No open lesions.     Musculoskeletal:    Lower extremity muscle strength is normal.  Patient is ambulatory without an assistive device or brace.  No gross deformities.  Normal ROM all fore foot and rearfoot joints.  No equinus.  With weightbearing patient has bilateral pronation.  No pain with ROM.   Pain with palpation posterior tibial tendon around the medial malleoli.  negative pain with stressing posterior tibial tendon.  Good calcaneal iversion with foot flexion.  negative erythema.  negative edema.  negative ecchymosis.   negative masses noted.      Radiographic:  X-rays normal.    A:  Pronation and posterior  tibial tendonitis    P:  X-rays from past personally reviewed.   discussed the cause of this with the patient and how pronation causes more stress on this tendon..  Wearing malleable shoe.  Good stiff shoes at all times.  It suggestions on good tennis shoes.  Suggested good house shoes.  Arch supports.  Call if wanting orthotics.   Ice bid.  Minimize activities until healed.  Explained must have good support for feet at all times or could develop tear in tendon in future.   RETURN TO CLINIC PRN.  Thank you for allowing me participate in the care of this patient.        Damian Phan, GUSTAVO, FACFAS

## 2023-11-28 NOTE — LETTER
11/28/2023         RE: Lalo Hill  [address]  [place] [address]        Dear Colleague,    Thank you for referring your patient, Lalo Hill, to the [facility]. Please see a copy of my visit note below.    S:  patient seen today in consult from Candis Sanon and complains of left foot pain.  Points to posterior tibial tendon where is courses around the medial malleoli.  Has had this for 6 months.  Describes it as a burning pain.  Aggrevated by activity and relieved by rest.  Patient has been more active over the last year.  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  Denies edema erythema ecchymosis numbness or weakness.    ROS: See above       Allergies   Allergen Reactions     Erythro [Erythromycin] Rash     Erythromycin Rash     Patient unsure - reaction occurred in a child       Current Outpatient Medications   Medication Sig Dispense Refill     lisinopril-hydrochlorothiazide (ZESTORETIC) 10-12.5 MG tablet Take 1 tablet by mouth daily 90 tablet 1     metoprolol succinate ER (TOPROL XL) 100 MG 24 hr tablet          Patient Active Problem List   Diagnosis     CARDIOVASCULAR SCREENING; LDL GOAL LESS THAN 160     Drusen (degenerative) of retina     Elevated blood pressure reading without diagnosis of hypertension     Bicuspid aortic valve     Nonintractable headache, unspecified chronicity pattern, unspecified headache type     Class 3 severe obesity due to excess calories with serious comorbidity and body mass index (BMI) of 40.0 to 44.9 in adult (H)     SVT (supraventricular tachycardia)     JOSE (obstructive sleep apnea)     Nocturnal hypoxemia     Trochanteric bursitis of left hip       Past Medical History:   Diagnosis Date     * * * SBE PROPHYLAXIS * * *     Bicuspid aortic valve/needs prophylaxis for dental procedures     Hypertension      SVT (supraventricular tachycardia)      SVT (supraventricular tachycardia)  9/26/2022       No past surgical history on file.    Family History   Problem Relation Age of Onset     Hypertension Father      Cancer Maternal Grandmother      C.A.D. Maternal Grandfather      Cerebrovascular Disease Maternal Grandfather      Prostate Cancer Maternal Grandfather      C.A.D. Paternal Grandmother      Blood Disease Paternal Grandmother      C.A.D. Paternal Grandfather      Cerebrovascular Disease Paternal Grandfather      Hypertension Paternal Grandfather      Prostate Cancer Paternal Grandfather        Social History     Tobacco Use     Smoking status: Never     Smokeless tobacco: Never   Substance Use Topics     Alcohol use: Yes     Alcohol/week: 2.0 standard drinks of alcohol     Types: 2 Cans of beer per week     Comment: rare, not even monthly         Exam:  Vitals: /85   Pulse 85   BMI: There is no height or weight on file to calculate BMI.  Height: Data Unavailable    Constitutional/ general:  Pt is in no apparent distress, appears well-nourished.  Cooperative with history and physical exam.     Psych:  The patient answered questions appropriately.  Normal affect.  Seems to have reasonable expectations, in terms of treatment.     Lungs:  Non labored breathing, non labored speech. No cough.  No audible wheezing. Even, quiet breathing.       Vascular:  Pedal pulses are palpable bilaterally for both the DP and PT arteries.  CFT < 3 sec.  No edema.  No varicosities.  Pedal hair growth noted.     Neuro:  Alert and oriented x 3. Coordinated gait.  Light touch sensation is intact  Derm: Normal texture and turgor.  No erythema, ecchymosis, or cyanosis.  No open lesions.     Musculoskeletal:    Lower extremity muscle strength is normal.  Patient is ambulatory without an assistive device or brace.  No gross deformities.  Normal ROM all fore foot and rearfoot joints.  No equinus.  With weightbearing patient has bilateral pronation.  No pain with ROM.   Pain with palpation posterior tibial tendon  around the medial malleoli.  negative pain with stressing posterior tibial tendon.  Good calcaneal iversion with foot flexion.  negative erythema.  negative edema.  negative ecchymosis.   negative masses noted.      Radiographic:  X-rays normal.    A:  Pronation and posterior tibial tendonitis    P:  X-rays from past personally reviewed.   discussed the cause of this with the patient and how pronation causes more stress on this tendon..  Wearing malleable shoe.  Good stiff shoes at all times.  It suggestions on good tennis shoes.  Suggested good house shoes.  Arch supports.  Call if wanting orthotics.   Ice bid.  Minimize activities until healed.  Explained must have good support for feet at all times or could develop tear in tendon in future.   RETURN TO CLINIC PRN.  Thank you for allowing me participate in the care of this patient.        Damian Phan DPM, FACFAS             Again, thank you for allowing me to participate in the care of your patient.        Sincerely,        Damian Phan DPM

## 2023-12-19 ENCOUNTER — OFFICE VISIT (OUTPATIENT)
Dept: URGENT CARE | Facility: URGENT CARE | Age: 33
End: 2023-12-19
Payer: COMMERCIAL

## 2023-12-19 VITALS
OXYGEN SATURATION: 97 % | TEMPERATURE: 98.4 F | WEIGHT: 303.4 LBS | DIASTOLIC BLOOD PRESSURE: 83 MMHG | SYSTOLIC BLOOD PRESSURE: 136 MMHG | HEART RATE: 83 BPM | RESPIRATION RATE: 20 BRPM | BODY MASS INDEX: 40.03 KG/M2

## 2023-12-19 DIAGNOSIS — M79.89 CALF SWELLING: ICD-10-CM

## 2023-12-19 DIAGNOSIS — M79.661 TENDERNESS OF RIGHT CALF: Primary | ICD-10-CM

## 2023-12-19 DIAGNOSIS — R07.0 THROAT PAIN: ICD-10-CM

## 2023-12-19 LAB
DEPRECATED S PYO AG THROAT QL EIA: NEGATIVE
GROUP A STREP BY PCR: NOT DETECTED

## 2023-12-19 PROCEDURE — 87635 SARS-COV-2 COVID-19 AMP PRB: CPT | Performed by: NURSE PRACTITIONER

## 2023-12-19 PROCEDURE — 87651 STREP A DNA AMP PROBE: CPT | Performed by: NURSE PRACTITIONER

## 2023-12-19 PROCEDURE — 99214 OFFICE O/P EST MOD 30 MIN: CPT | Performed by: NURSE PRACTITIONER

## 2023-12-19 NOTE — PROGRESS NOTES
Assessment & Plan     Tenderness of right calf      Throat pain    - Streptococcus A Rapid Screen w/Reflex to PCR - Clinic Collect  - Group A Streptococcus PCR Throat Swab  - Symptomatic COVID-19 Virus (Coronavirus) by PCR Nose    Calf swelling         Reviewed negative rapid strep results during visit, PCR testing in process and COVID test in process, will notify if positive. Discussed symptoms likely viral in nature and antibiotic not indicated at this time. Recommended rest, fluids, tylenol as needed, gargle warm salt water, throat lozenges, nasal saline.    Recommend further evaluation in emergency room for right calf pain and swelling as cannot rule out DVT in urgent care.     Patient agreeable and discharged in stable condition.         Natasha Rg NP  [facility] ANDCHRISSIE May is a 33 year old male who presents to clinic today for the following health issues:  Chief Complaint   Patient presents with    URI     runnynose    Pharyngitis     X 1day     Patient presents for evaluation of sore throat. Associated symptoms: runny nose, decreased appetite, rare cough. Denies fever. No known exposures. No immunosuppression. Symptoms started yesterday and have been stable.     Also has been having bilateral calf pain for the past week, right worse than left. Denies redness, drainage, shortness of breath. No history of DVT.       Problem list, Medication list, Allergies, and Medical history reviewed in EPIC.    ROS:  Review of systems negative except for noted above        Objective    /83   Pulse 83   Temp 98.4  F (36.9  C) (Tympanic)   Resp 20   Wt 137.6 kg (303 lb 6.4 oz)   SpO2 97%   BMI 40.03 kg/m    Physical Exam  Constitutional:       General: He is not in acute distress.     Appearance: He is not toxic-appearing or diaphoretic.   HENT:      Head: Normocephalic and atraumatic.      Right Ear: Tympanic membrane, ear canal and external ear normal.      Left  Ear: Tympanic membrane, ear canal and external ear normal.      Ears:      Comments: Cerumen left canal     Nose:      Comments: Mild nasal congestion     Mouth/Throat:      Mouth: Mucous membranes are moist.      Pharynx: Oropharynx is clear. Posterior oropharyngeal erythema present. No oropharyngeal exudate.      Comments: Moderate oropharyngeal erythema  Eyes:      Conjunctiva/sclera: Conjunctivae normal.   Cardiovascular:      Rate and Rhythm: Normal rate and regular rhythm.      Heart sounds: Normal heart sounds.   Pulmonary:      Effort: Pulmonary effort is normal. No respiratory distress.      Breath sounds: Normal breath sounds. No wheezing, rhonchi or rales.   Musculoskeletal:      Right lower leg: Edema present.      Comments: Tenderness with palpation right calf   Lymphadenopathy:      Cervical: No cervical adenopathy.   Skin:     General: Skin is warm and dry.      Findings: No bruising or erythema.   Neurological:      Mental Status: He is alert.              Labs:  Results for orders placed or performed in visit on 12/19/23   Streptococcus A Rapid Screen w/Reflex to PCR - Clinic Collect     Status: Normal    Specimen: Throat; Swab   Result Value Ref Range    Group A Strep antigen Negative Negative

## 2023-12-20 LAB — SARS-COV-2 RNA RESP QL NAA+PROBE: NEGATIVE

## 2023-12-22 ENCOUNTER — VIRTUAL VISIT (OUTPATIENT)
Dept: SLEEP MEDICINE | Facility: CLINIC | Age: 33
End: 2023-12-22
Payer: COMMERCIAL

## 2023-12-22 DIAGNOSIS — G47.10 EXCESSIVE SLEEPINESS: Primary | ICD-10-CM

## 2023-12-22 DIAGNOSIS — G47.33 OSA (OBSTRUCTIVE SLEEP APNEA): ICD-10-CM

## 2023-12-22 DIAGNOSIS — G47.34 NOCTURNAL HYPOXEMIA: ICD-10-CM

## 2023-12-22 ASSESSMENT — SLEEP AND FATIGUE QUESTIONNAIRES
HOW LIKELY ARE YOU TO NOD OFF OR FALL ASLEEP WHILE SITTING QUIETLY AFTER LUNCH WITHOUT ALCOHOL: WOULD NEVER DOZE
HOW LIKELY ARE YOU TO NOD OFF OR FALL ASLEEP WHILE SITTING AND READING: WOULD NEVER DOZE
HOW LIKELY ARE YOU TO NOD OFF OR FALL ASLEEP IN A CAR, WHILE STOPPED FOR A FEW MINUTES IN TRAFFIC: WOULD NEVER DOZE
HOW LIKELY ARE YOU TO NOD OFF OR FALL ASLEEP WHILE SITTING INACTIVE IN A PUBLIC PLACE: WOULD NEVER DOZE
HOW LIKELY ARE YOU TO NOD OFF OR FALL ASLEEP WHILE WATCHING TV: WOULD NEVER DOZE
HOW LIKELY ARE YOU TO NOD OFF OR FALL ASLEEP WHILE LYING DOWN TO REST IN THE AFTERNOON WHEN CIRCUMSTANCES PERMIT: SLIGHT CHANCE OF DOZING
HOW LIKELY ARE YOU TO NOD OFF OR FALL ASLEEP WHEN YOU ARE A PASSENGER IN A CAR FOR AN HOUR WITHOUT A BREAK: WOULD NEVER DOZE
HOW LIKELY ARE YOU TO NOD OFF OR FALL ASLEEP WHILE SITTING AND TALKING TO SOMEONE: WOULD NEVER DOZE

## 2023-12-27 ENCOUNTER — OFFICE VISIT (OUTPATIENT)
Dept: URGENT CARE | Facility: URGENT CARE | Age: 33
End: 2023-12-27
Payer: COMMERCIAL

## 2023-12-27 VITALS
HEART RATE: 86 BPM | BODY MASS INDEX: 40.5 KG/M2 | OXYGEN SATURATION: 99 % | SYSTOLIC BLOOD PRESSURE: 151 MMHG | WEIGHT: 307 LBS | RESPIRATION RATE: 12 BRPM | DIASTOLIC BLOOD PRESSURE: 84 MMHG | TEMPERATURE: 98.2 F

## 2023-12-27 DIAGNOSIS — J40 BRONCHITIS: Primary | ICD-10-CM

## 2023-12-27 PROCEDURE — 95800 SLP STDY UNATTENDED: CPT | Performed by: INTERNAL MEDICINE

## 2023-12-27 PROCEDURE — 99213 OFFICE O/P EST LOW 20 MIN: CPT | Performed by: FAMILY MEDICINE

## 2023-12-27 NOTE — PROGRESS NOTES
Watch Pat has been scored using rule 1B, 4%.  Patient to follow up with provider to determine appropriate therapy.    PAT AHI: 3.1    Ordering Provider: Bennett Goltz, PA-C

## 2023-12-27 NOTE — PROGRESS NOTES
(J40) Bronchitis  (primary encounter diagnosis)  Comment:   Plan: amoxicillin-clavulanate (AUGMENTIN) 875-125 MG         tablet            CHIEF COMPLAINT    Patient has had a cough for about 9 days.  He had an initial workup 12-19 when he tested negative for COVID and strep.    He is getting some yellowish sputum production.  He has not noticed fever.    He is a non-smoker.  Has no history of asthma.      REVIEW OF SYSTEMS    As above.  Slight sore throat.  No SOB.  No chest pain.  No nausea.      EXAM  BP (!) 151/84 (BP Location: Right arm, Cuff Size: Adult Large)   Pulse 86   Temp 98.2  F (36.8  C) (Tympanic)   Resp 12   Wt 139.3 kg (307 lb)   SpO2 99%   BMI 40.50 kg/m      Pharynx shows no acute inflammation or swelling.  No cervical adenopathy palpable.  Lungs are clear.

## 2023-12-29 NOTE — PROCEDURES
"WatchPAT - HOME SLEEP STUDY INTERPRETATION    Patient: Lalo Hill  MRN: [id number]  YOB: 1990  Study Date: 12/27/2023  Referring Provider: Eliza Uriarte;   Ordering Provider: Bennett Goltz, PA-C     Indications for Home Study: Lalo Hill is a 33 year old male, currently on auto PAP therapy for severe JOSE, who undergoes WatchPAT HST on CPAP.    Estimated body mass index is 40.5 kg/m  as calculated from the following:    Height as of 10/20/23: 1.854 m (6' 1\").    Weight as of 12/27/23: 139.3 kg (307 lb).  Total score - [place]: 1 (12/22/2023  1:00 AM)    Data: A full night home sleep study was performed recording the standard physiologic parameters including peripheral arterial tonometry (PAT), sound/snoring, body position,  movement, sound, and oxygen saturation by pulse oximetry. Pulse rate was estimated by oximetry recording. Sleep staging (wake, REM, light, and deep sleep) was derived from PAT signal.  This study was considered adequate based on > 4 hours of quality oximetry and respiratory recording. As specified by the AASM Manual for the Scoring of Sleep and Associated events, version 2.3, Rule VIII.D 1B, 4% oxygen desaturation scoring for hypopneas is used as a standard of care on all home sleep apnea testing.    Total Recording Time: 8 hrs, 8 min  Total Sleep Time: 7 hrs, 27 min  % of Sleep Time REM: 32%    Respiratory:  Respiratory events: The PAT respiratory disturbance index [pRDI] was 8.6 events per hour.  The PAT apnea/hypopnea index [pAHI] was 3.1 events per hour.  AZEB was 2.6 events per hour.  During REM sleep the pAHI was 6.3.  Sleep Associated Hypoxemia: sustained hypoxemia was not present. Mean oxygen saturation was 94%.  Minimum was 88%.  Time with saturation less than 88% was 0 minutes.    Heart Rate: By pulse oximetry normal rate was noted.     Position: Percent of time spent: supine - 100%, prone - 0%, on right - 0%, on left - 0%.  pAHI was 3.1 per hour supine, N/A per " Per Dr. Scott, ok to refill Bactrim, once has tapered prednisone to 20 mg ok to stop then. Refilled Bactrim.  Kami Simmons LPN  Nephrology  [phone]      The taper would be    down to 50mg for 2 weeks                40mg for 2 weeks               30 mg for 2 weeks               20mg for 2 weeks               10mg 2 weeks   can stop bactrim (sulfamethazole) pill                5mg until you see me in clinic  Labs in November (2 months from now)   hour prone, N/A per hour on right side, and N/A per hour on left side.     Assessment:   Adequate treatment of sleep apnea and hypoxemia with CPAP therapy is demonstrated on this test.  Sleep associated hypoxemia was not present.    Recommendations:  Continue regular adherence to CPAP therapy  Weight management.    Diagnosis Code(s): Obstructive Sleep Apnea G47.33    Glenn Kennedy MD, December 28, 2023   Diplomate, American Board of Psychiatry and Neurology, Sleep Medicine

## 2024-01-02 ENCOUNTER — TRANSFERRED RECORDS (OUTPATIENT)
Dept: HEALTH INFORMATION MANAGEMENT | Facility: CLINIC | Age: 34
End: 2024-01-02
Payer: COMMERCIAL

## 2024-01-22 NOTE — PROGRESS NOTES
Pt last seen in PT 11/22.  He has since been seen in podiatry, urgent care, and ED.  See MyChart exchange as well.  No further PT is scheduled.  Consider note dated 11/22 to serve as final summary.

## 2024-02-23 ENCOUNTER — NURSE TRIAGE (OUTPATIENT)
Dept: FAMILY MEDICINE | Facility: CLINIC | Age: 34
End: 2024-02-23

## 2024-02-23 ENCOUNTER — OFFICE VISIT (OUTPATIENT)
Dept: PEDIATRICS | Facility: CLINIC | Age: 34
End: 2024-02-23
Payer: COMMERCIAL

## 2024-02-23 VITALS
OXYGEN SATURATION: 100 % | BODY MASS INDEX: 40.69 KG/M2 | RESPIRATION RATE: 16 BRPM | TEMPERATURE: 97.4 F | DIASTOLIC BLOOD PRESSURE: 73 MMHG | WEIGHT: 307 LBS | HEART RATE: 75 BPM | SYSTOLIC BLOOD PRESSURE: 124 MMHG | HEIGHT: 73 IN

## 2024-02-23 DIAGNOSIS — M79.662 PAIN OF LEFT LOWER LEG: Primary | ICD-10-CM

## 2024-02-23 LAB
ANION GAP SERPL CALCULATED.3IONS-SCNC: 13 MMOL/L (ref 7–15)
BUN SERPL-MCNC: 9.8 MG/DL (ref 6–20)
CALCIUM SERPL-MCNC: 9.8 MG/DL (ref 8.6–10)
CHLORIDE SERPL-SCNC: 99 MMOL/L (ref 98–107)
CREAT SERPL-MCNC: 0.83 MG/DL (ref 0.67–1.17)
D DIMER PPP FEU-MCNC: <0.27 UG/ML FEU (ref 0–0.5)
DEPRECATED HCO3 PLAS-SCNC: 25 MMOL/L (ref 22–29)
EGFRCR SERPLBLD CKD-EPI 2021: >90 ML/MIN/1.73M2
ERYTHROCYTE [DISTWIDTH] IN BLOOD BY AUTOMATED COUNT: 13 % (ref 10–15)
GLUCOSE SERPL-MCNC: 103 MG/DL (ref 70–99)
HCT VFR BLD AUTO: 47.6 % (ref 40–53)
HGB BLD-MCNC: 16.3 G/DL (ref 13.3–17.7)
MCH RBC QN AUTO: 28.9 PG (ref 26.5–33)
MCHC RBC AUTO-ENTMCNC: 34.2 G/DL (ref 31.5–36.5)
MCV RBC AUTO: 84 FL (ref 78–100)
PLATELET # BLD AUTO: 341 10E3/UL (ref 150–450)
POTASSIUM SERPL-SCNC: 3.8 MMOL/L (ref 3.4–5.3)
RBC # BLD AUTO: 5.64 10E6/UL (ref 4.4–5.9)
SODIUM SERPL-SCNC: 137 MMOL/L (ref 135–145)
WBC # BLD AUTO: 8.8 10E3/UL (ref 4–11)

## 2024-02-23 PROCEDURE — 85379 FIBRIN DEGRADATION QUANT: CPT | Performed by: EMERGENCY MEDICINE

## 2024-02-23 PROCEDURE — 80048 BASIC METABOLIC PNL TOTAL CA: CPT | Performed by: EMERGENCY MEDICINE

## 2024-02-23 PROCEDURE — 99213 OFFICE O/P EST LOW 20 MIN: CPT | Performed by: EMERGENCY MEDICINE

## 2024-02-23 PROCEDURE — 36415 COLL VENOUS BLD VENIPUNCTURE: CPT | Performed by: EMERGENCY MEDICINE

## 2024-02-23 PROCEDURE — 85027 COMPLETE CBC AUTOMATED: CPT | Performed by: EMERGENCY MEDICINE

## 2024-02-23 ASSESSMENT — PAIN SCALES - GENERAL: PAINLEVEL: MILD PAIN (3)

## 2024-02-23 NOTE — TELEPHONE ENCOUNTER
Nurse Triage SBAR    Is this a 2nd Level Triage? NO    Situation: Worsening L leg pain and new swelling, pain 3-4/10 this AM which is the worst it's been, skin over L shin looks shiny, more swollen than usual. Thinks area below his calf may also be involved in the swelling but isn't sure. L foot intermittently mild tingling per patient. Leg is painful to walk on but is able to walk. Denies any heat/increased pain with palpation, denies fever. No recent injury. No CP/SOB.    Background: Has had left and right leg pain intermittent for last few months. Per patient, had ultrasound done in August on L leg and it was negative for DVT. Went to ED in December 2023 for R leg swelling and pain and it was negative for DVT as well. Is working with PT and ortho for leg pain but concerned due to worsening symptoms. Does have trochanteric bursitis of L hip.    Assessment: Likely needs assessment for DVT rule out vs contacting ortho for recommendations    Protocol Recommended Disposition:   Go To ED/UCC Now (Or To Office With PCP Approval)    Recommendation: Recommended patient be seen, but isn't sure of best place, since this condition seems to be monitored by ortho/PT but patient has concerns of DVT due to worsening of pain and swelling. Called [facility] to see if this would be appropriate - spoke with Myla alves at [facility] and she huddled with provider, OK to be seen. Writer did not speak with [facility] provider directly regarding patient. [facility] staff with reach out to patient to schedule.       CHARLOTTE Angeles, RN  [facility]    Reason for Disposition   Thigh or calf pain in only one leg and present > 1 hour    Additional Information   Negative: Looks like a broken bone or dislocated joint (e.g., crooked or deformed)   Negative: Sounds like a life-threatening emergency to the triager   Negative: Followed a hip injury   Negative: Followed a knee injury   Negative: Followed an ankle or foot injury   Negative: Back  pain radiating (shooting) into leg(s)   Negative: Foot pain is main symptom   Negative: Ankle pain is main symptom   Negative: Knee pain is main symptom   Negative: Leg swelling is main symptom   Negative: Chest pain   Negative: Difficulty breathing   Negative: Entire foot is cool or blue in comparison to other side   Negative: Unable to walk   Negative: Fever and red area (or area very tender to touch)   Negative: Swollen joint and fever    Protocols used: Leg Pain-A-OH

## 2024-02-23 NOTE — PROGRESS NOTES
Impression:  Left leg pain.  Workup is negative for DVT with a normal D-dimer and he is at low risk.  Probably has some musculoskeletal injury.    Plan:  Tylenol or ibuprofen for the pain.  Ice to the painful area.  Limited activity as tolerated.  Will refer to physical therapy for further evaluation and treatment      Chief Complaint:  Patient presents with:  Deep Vein Thrombosis: R/O Left Leg          HPI:   Lalo Hill is a 34 year old male who presents to this clinic for the evaluation of pain in the left lower leg.  Patient complains of pain in the left lower leg for several months.  Its gotten gradually worse over the last 2 weeks.  He describes it as an aching and burning sensation in the calf and on the shin.  Is not worse when he walks.  He does occasionally have some tingling in his toes on the left side.  He has previously had a venous Doppler on the left that was negative for DVT.  He also had a venous Doppler on the right that was negative for DVT.  He no longer has the right leg pain.  He does have some chronic lower back pain.  He has been seeing orthopedics and physical therapy for his leg pain.  He is concerned because the pain is worsening.  There is no shortness of breath, cough, chest pain.  He has never had a DVT.  No recent immobilization.  Does have a history of a bicuspid aortic valve      PMH:   Past Medical History:   Diagnosis Date    * * * SBE PROPHYLAXIS * * *     Bicuspid aortic valve/needs prophylaxis for dental procedures    Hypertension     SVT (supraventricular tachycardia)     SVT (supraventricular tachycardia) 9/26/2022     No past surgical history on file.      ROS:  All other systems negative    Meds:    Current Outpatient Medications:     lisinopril-hydrochlorothiazide (ZESTORETIC) 10-12.5 MG tablet, Take 1 tablet by mouth daily, Disp: 90 tablet, Rfl: 1    metoprolol succinate ER (TOPROL XL) 100 MG 24 hr tablet, , Disp: , Rfl:         Social:  Social History     Socioeconomic  History    Marital status: Single     Spouse name: Not on file    Number of children: Not on file    Years of education: Not on file    Highest education level: Not on file   Occupational History    Not on file   Tobacco Use    Smoking status: Never    Smokeless tobacco: Never   Vaping Use    Vaping Use: Never used   Substance and Sexual Activity    Alcohol use: Yes     Alcohol/week: 2.0 standard drinks of alcohol     Types: 2 Cans of beer per week     Comment: rare, not even monthly    Drug use: No    Sexual activity: Not Currently     Partners: Female     Birth control/protection: Condom, I.U.D.   Other Topics Concern    Parent/sibling w/ CABG, MI or angioplasty before 65F 55M? No   Social History Narrative    Not on file     Social Determinants of Health     Financial Resource Strain: Low Risk  (10/9/2023)    Financial Resource Strain     Within the past 12 months, have you or your family members you live with been unable to get utilities (heat, electricity) when it was really needed?: No   Food Insecurity: Low Risk  (10/9/2023)    Food Insecurity     Within the past 12 months, did you worry that your food would run out before you got money to buy more?: No     Within the past 12 months, did the food you bought just not last and you didn t have money to get more?: No   Transportation Needs: Low Risk  (10/9/2023)    Transportation Needs     Within the past 12 months, has lack of transportation kept you from medical appointments, getting your medicines, non-medical meetings or appointments, work, or from getting things that you need?: No   Physical Activity: Not on file   Stress: Not on file   Social Connections: Not on file   Interpersonal Safety: Low Risk  (10/20/2023)    Interpersonal Safety     Do you feel physically and emotionally safe where you currently live?: Yes     Within the past 12 months, have you been hit, slapped, kicked or otherwise physically hurt by someone?: No     Within the past 12 months,  "have you been humiliated or emotionally abused in other ways by your partner or ex-partner?: No   Housing Stability: Low Risk  (10/9/2023)    Housing Stability     Do you have housing? : Yes     Are you worried about losing your housing?: No         Physical Exam:  Vitals:    02/23/24 1223   BP: 124/73   BP Location: Right arm   Patient Position: Sitting   Cuff Size: Adult Large   Pulse: 75   Resp: 16   Temp: 97.4  F (36.3  C)   TempSrc: Oral   SpO2: 100%   Weight: 139.3 kg (307 lb)   Height: 1.854 m (6' 1\")      Patient is awake, alert, no distress  Extremities: Left lower extremity shows some tenderness of the calf and the shin.  Not well localized.  There is no edema.  Pedal pulses are normal.  The skin is warm and slightly erythematous.  No cord or Homans' sign.  Skin: No lesions or rash  Neuro: Normal motor and sensory function in all extremities  Psych: Awake, alert, normally responsive      Results:    Results for orders placed or performed in visit on 02/23/24 (from the past 24 hour(s))   Basic metabolic panel   Result Value Ref Range    Sodium 137 135 - 145 mmol/L    Potassium 3.8 3.4 - 5.3 mmol/L    Chloride 99 98 - 107 mmol/L    Carbon Dioxide (CO2) 25 22 - 29 mmol/L    Anion Gap 13 7 - 15 mmol/L    Urea Nitrogen 9.8 6.0 - 20.0 mg/dL    Creatinine 0.83 0.67 - 1.17 mg/dL    GFR Estimate >90 >60 mL/min/1.73m2    Calcium 9.8 8.6 - 10.0 mg/dL    Glucose 103 (H) 70 - 99 mg/dL   CBC with platelets   Result Value Ref Range    WBC Count 8.8 4.0 - 11.0 10e3/uL    RBC Count 5.64 4.40 - 5.90 10e6/uL    Hemoglobin 16.3 13.3 - 17.7 g/dL    Hematocrit 47.6 40.0 - 53.0 %    MCV 84 78 - 100 fL    MCH 28.9 26.5 - 33.0 pg    MCHC 34.2 31.5 - 36.5 g/dL    RDW 13.0 10.0 - 15.0 %    Platelet Count 341 150 - 450 10e3/uL   D dimer quantitative   Result Value Ref Range    D-Dimer Quantitative <0.27 0.00 - 0.50 ug/mL FEU    Narrative    This D-dimer assay is intended for use in conjunction with a clinical pretest probability " assessment model to exclude pulmonary embolism (PE) and deep venous thrombosis (DVT) in outpatients suspected of PE or DVT. The cut-off value is 0.50 ug/mL FEU.        No results found for this or any previous visit (from the past 24 hour(s)).       Brent Bravo MD

## 2024-02-23 NOTE — PROGRESS NOTES
"  {PROVIDER CHARTING PREFERENCE:316668}    Casey May is a 34 year old, presenting for the following health issues:  Deep Vein Thrombosis (R/O Left Leg )  {(!) Visit Details have not yet been documented.  Please enter Visit Details and then use this list to pull in documentation. (Optional):969604}  HPI     Evaluation for possible DVT  Onset/Duration: 2 weeks ago   Description:       Location: Left shin and Calf        Redness: no        Pain: 3-4/10       Warmth: no        Joint swelling no   Progression of symptoms worse - gradually   Accompanying signs and symptoms:       Fevers: no        Numbness/tingling/weakness: YES pt reports that foot feels as if its starting to fall asleep but not as intense        Chest pain/pleurisy: no        Shortness of breath: no   History        Trauma: no         Recent travel/when: no         Previous history of DVT: no         Family history of DVT: no         Recent surgery: no   Aggravating factors include: walking  Therapies tried and outcome: rest/inactivity, stretching, and physical therapy  Prior surgery on arteries of veins in this area: No  {Link to age adjusted D-dimer (UTD)   :025249}      {ROS Picklists (Optional):245869}      Objective    /73 (BP Location: Right arm, Patient Position: Sitting, Cuff Size: Adult Large)   Pulse 75   Temp 97.4  F (36.3  C) (Oral)   Resp 16   Ht 1.854 m (6' 1\")   Wt 139.3 kg (307 lb)   SpO2 100%   BMI 40.50 kg/m    Body mass index is 40.5 kg/m .  Physical Exam   {Exam List (Optional):237338}    {Diagnostic Test Results (Optional):428402}        Signed Electronically by: DIANE WESTBROOK MD  {Email feedback regarding this note to [email or web address]   :980859}  "

## 2024-02-28 ENCOUNTER — THERAPY VISIT (OUTPATIENT)
Dept: PHYSICAL THERAPY | Facility: CLINIC | Age: 34
End: 2024-02-28
Attending: EMERGENCY MEDICINE
Payer: COMMERCIAL

## 2024-02-28 DIAGNOSIS — M79.662 PAIN OF LEFT LOWER LEG: ICD-10-CM

## 2024-02-28 DIAGNOSIS — M79.662 PAIN OF LEFT CALF: Primary | ICD-10-CM

## 2024-02-28 PROCEDURE — 97161 PT EVAL LOW COMPLEX 20 MIN: CPT | Mod: GP | Performed by: PHYSICAL THERAPIST

## 2024-02-28 PROCEDURE — 97110 THERAPEUTIC EXERCISES: CPT | Mod: GP | Performed by: PHYSICAL THERAPIST

## 2024-02-28 PROCEDURE — 97140 MANUAL THERAPY 1/> REGIONS: CPT | Mod: GP | Performed by: PHYSICAL THERAPIST

## 2024-02-28 ASSESSMENT — ACTIVITIES OF DAILY LIVING (ADL)
PLEASE_INDICATE_YOR_PRIMARY_REASON_FOR_REFERRAL_TO_THERAPY:: FOOT AND/OR ANKLE
ANY_OF_YOUR_USUAL_WORK,_HOUSEWORK_OR_SCHOOL_ACTIVITIES: A LITTLE BIT OF DIFFICULTY
RUNNING_ON_UNEVEN_GROUND: A LITTLE BIT OF DIFFICULTY
PERFORMING_LIGHT_ACTIVITIES_AROUND_YOUR_HOME: NO DIFFICULTY
WALKING_BETWEEN_ROOMS: NO DIFFICULTY
ROLLING_OVER_IN_BED: NO DIFFICULTY
MAKING_SHARP_TURNS_WHILE_RUNNING_FAST: A LITTLE BIT OF DIFFICULTY
LEFS_SCORE(%): INCOMPLETE
YOUR_USUAL_HOBBIES,_RECREATIONAL_OR_SPORTING_ACTIVITIES: A LITTLE BIT OF DIFFICULTY
LEFS_RAW_SCORE: INCOMPLETE
SHOPPING: NO DIFFICULTY
LIFTING_AN_OBJECT,_LIKE_A_BAG_OF_GROCERIES_FROM_THE_FLOOR: NO DIFFICULTY
GOING_UP_OR_DOWN_10_STAIRS: NO DIFFICULTY
GETTING_INTO_AND_OUT_OF_A_BATH: NO DIFFICULTY
GETTING_INTO_OR_OUT_OF_A_CAR: NO DIFFICULTY
PERFORMING_HEAVY_ACTIVITIES_AROUND_YOUR_HOME: A LITTLE BIT OF DIFFICULTY
WALKING_2_BLOCKS: NO DIFFICULTY
STANDING_FOR_1_HOUR: A LITTLE BIT OF DIFFICULTY
WALKING_A_MILE: A LITTLE BIT OF DIFFICULTY
PUTTING_ON_YOUR_SHOES_OR_SOCKS: NO DIFFICULTY
RUNNING_ON_EVEN_GROUND: A LITTLE BIT OF DIFFICULTY
SITTING_FOR_1_HOUR: NO DIFFICULTY

## 2024-02-28 NOTE — PROGRESS NOTES
PHYSICAL THERAPY EVALUATION  Type of Visit: Evaluation    See electronic medical record for Abuse and Falls Screening details.    Subjective       Presenting condition or subjective complaint: Calf pain.  Burning in L calf mainly.  Seems to be the same as it was previously.  Most pain on outside of calf.  Has continued with exercises from PT late last year and they help only slightly, mainly with back.    Date of onset: 02/23/24 (MD referral)    Relevant medical history: Heart problems   Dates & types of surgery: None    Prior diagnostic imaging/testing results: X-ray; Other Ultrasound - negative  Prior therapy history for the same diagnosis, illness or injury: Yes October 2023. Physical therapy    Prior Level of Function  Transfers: Independent  Ambulation: Independent  ADL: Independent  IADL:     Living Environment  Social support: Alone   Type of home: House   Stairs to enter the home: Yes 14 Is there a railing: Yes   Ramp: No   Stairs inside the home: Yes 14 Is there a railing: Yes   Help at home: Home and Yard maintenance tasks  Equipment owned:       Employment: Yes   Hobbies/Interests: Walking, biking, video games, board games, card games    Patient goals for therapy: Go for walks without worrying about making the pain worse    Pain assessment:      Objective   FOOT/ANKLE EVALUATION  PAIN: Pain Level at Rest: 0/10  Pain Level with Use: 5/10  Pain Location: calf  Pain Quality: Aching, Burning, and Sharp  Pain Frequency: constant  Pain is Worst: daytime or nighttime  Pain is Exacerbated By: prolong sitting;  stairs when pain is present  Pain is Relieved By: cold and elevate legs, ibuprofen  Pain Progression: Unchanged  INTEGUMENTARY (edema, incisions): WNL  POSTURE: WNL  GAIT:   Weightbearing Status:   Assistive Device(s):   Gait Deviations:   BALANCE/PROPRIOCEPTION:  R SLS x 30 sec;  L SLS 10 sec with increased ankle stratergy  WEIGHT BEARING ALIGNMENT:   NON-WEIGHTBEARING ALIGNMENT:    ROM:    (Degrees) Left AROM Left PROM  Right AROM Right PROM   Ankle Dorsiflexion Wendi with tightness  Normal     Ankle Plantarflexion Normal   Normal     Ankle Inversion       Ankle Eversion       Great Toe Flexion       Great Toe Extension       Pain:   End feel:       STRENGTH: WNL  FLEXIBILITY: WNL  SPECIAL TESTS:   FUNCTIONAL TESTS: Double Leg Squat: Able to perform full deep squat without pain  PALPATION:   + Tenderness at Location Left Right   Gastroc/Soleus +    Achilles Tendon -    Anterior Tibialis     Posterior Tibialis     Incisional     Deltoid Ligament     Plantar Fascia     Navicular     Medial Calcaneal     Peroneals     Anterior Talofibular Ligament     Posterior Talofibular Ligament     Calcaneofibular Ligament     Medial Malleolus     Lateral Malleolus     Anterior Tibiofibular Ligament     Posterior Tibiofibular Ligament       JOINT MOBILITY: WNL    Assessment & Plan   CLINICAL IMPRESSIONS  Medical Diagnosis: pain L lower leg    Treatment Diagnosis: L gastroc pain   Impression/Assessment: Patient is a 34 year old male with L gastroc complaints.  The following significant findings have been identified: Pain, Decreased ROM/flexibility, Decreased strength, Impaired balance, Impaired muscle performance, and Decreased activity tolerance. These impairments interfere with their ability to perform self care tasks, work tasks, recreational activities, household chores, driving , household mobility, and community mobility as compared to previous level of function.     Clinical Decision Making (Complexity):  Clinical Presentation: Stable/Uncomplicated  Clinical Presentation Rationale: based on medical and personal factors listed in PT evaluation  Clinical Decision Making (Complexity): Low complexity    PLAN OF CARE  Treatment Interventions:  Interventions: Manual Therapy, Neuromuscular Re-education, Therapeutic Activity, Therapeutic Exercise    Long Term Goals     PT Goal 1  Goal Identifier: L calf  Goal  Description: Pt able to ambulate without calf pain  Rationale: to maximize safety and independence with performance of ADLs and functional tasks;to maximize safety and independence within the home;to maximize safety and independence within the community;to maximize safety and independence with transportation;to maximize safety and independence with self cares  Target Date: 08/28/24      Frequency of Treatment: 2x/ month  Duration of Treatment: 3 month    Recommended Referrals to Other Professionals:   Education Assessment:   Learner/Method: Patient;Demonstration    Risks and benefits of evaluation/treatment have been explained.   Patient/Family/caregiver agrees with Plan of Care.     Evaluation Time:     PT Eval, Low Complexity Minutes ([id number]): 20       Signing Clinician: Florian Colby PT

## 2024-03-13 ENCOUNTER — THERAPY VISIT (OUTPATIENT)
Dept: PHYSICAL THERAPY | Facility: CLINIC | Age: 34
End: 2024-03-13
Payer: COMMERCIAL

## 2024-03-13 DIAGNOSIS — M79.662 PAIN OF LEFT CALF: Primary | ICD-10-CM

## 2024-03-13 PROCEDURE — 97110 THERAPEUTIC EXERCISES: CPT | Mod: GP | Performed by: PHYSICAL THERAPIST

## 2024-03-13 PROCEDURE — 97112 NEUROMUSCULAR REEDUCATION: CPT | Mod: GP | Performed by: PHYSICAL THERAPIST

## 2024-04-02 ENCOUNTER — THERAPY VISIT (OUTPATIENT)
Dept: PHYSICAL THERAPY | Facility: CLINIC | Age: 34
End: 2024-04-02
Payer: COMMERCIAL

## 2024-04-02 DIAGNOSIS — M79.662 PAIN OF LEFT CALF: Primary | ICD-10-CM

## 2024-04-02 PROCEDURE — 97112 NEUROMUSCULAR REEDUCATION: CPT | Mod: GP | Performed by: PHYSICAL THERAPIST

## 2024-04-03 ENCOUNTER — NURSE TRIAGE (OUTPATIENT)
Dept: NURSING | Facility: CLINIC | Age: 34
End: 2024-04-03
Payer: COMMERCIAL

## 2024-04-03 NOTE — TELEPHONE ENCOUNTER
"Patient calling with neck pain that has been going on for a week or two. A couple days ago while laying down in bed he noticed a bulge in his vein that doesn't feel right but is unsure.  This is on the left side down from the ear by the collar bone. Neck pain rating 3/10 but it can be worse. The pain is on the left and towards the shoulder. At times the pain can go from the left shoulder all the way up to the jaw line. No change in mobility in his neck. Patient has been treating with advil. Patient can move it \"relatively fine but at times it can feel tired.\" No redness or swelling. Patient has a bicuspid aortic valve and has been having on and off chest pain on the right side but this has been going on longer than the neck issue and no chest pain at time of the call.   The neck pain comes and goes and can flutuate with severity but no known triggers. It does not disturb his sleeping. No unusual sweating or shortness of breath.   Protocol recommends see PCP within 3 days  Care advice given. Patient to call back with worsening symptoms or return of any chest pain.  If unable to schedule in primary clinic patient will present to . Location and hours for UC reviewed  Destiny Schaefer RN   04/03/24 9:28 AM  [facility] Nurse Advisor    Reason for Disposition   MODERATE neck pain (e.g., interferes with normal activities like work or school)    Additional Information   Negative: Shock suspected (e.g., cold/pale/clammy skin, too weak to stand, low BP, rapid pulse)   Negative: Similar pain previously and it was from 'heart attack'   Negative: Similar pain previously from 'angina' and not relieved by nitroglycerin   Negative: Difficult to awaken or acting confused (e.g., disoriented, slurred speech)   Negative: Sounds like a life-threatening emergency to the triager   Negative: Followed an injury to neck (e.g., MVA, sports, impact or collision)   Negative: Chest pain   Negative: Lymph node in the neck is swollen or " painful to the touch   Negative: Sore throat is main symptom   Negative: Difficulty breathing or unusual sweating (e.g., sweating without exertion)   Negative: Chest pain lasting longer than 5 minutes   Negative: Stiff neck (can't touch chin to chest) and has headache   Negative: Stiff neck (can't touch chin to chest) and fever   Negative: Weakness of an arm or hand   Negative: Problems with bowel or bladder control   Negative: Patient sounds very sick or weak to the triager   Negative: SEVERE pain (e.g., excruciating, unable to do any normal activities)   Negative: Head is twisting to one side (or ask 'is it turning against your will?')   Negative: Fever > 103 F (39.4 C)   Negative: Fever > 100.0 F (37.8 C) and Intravenous Drug Use (IVDU)   Negative: Fever > 100.0 F (37.8 C) and has diabetes mellitus or a weak immune system (e.g., HIV positive, cancer chemotherapy, organ transplant, splenectomy, chronic steroids)   Negative: Numbness in an arm or hand (i.e., loss of sensation)   Negative: Painful rash with multiple small blisters grouped together (i.e., dermatomal distribution or 'band' or 'stripe')   Negative: High-risk adult (e.g., history of cancer, HIV, or IV Drug Use)   Negative: Patient wants to be seen   Negative: Tenderness in front of neck over windpipe    Protocols used: Neck Pain or [id number]

## 2024-04-04 ENCOUNTER — OFFICE VISIT (OUTPATIENT)
Dept: FAMILY MEDICINE | Facility: CLINIC | Age: 34
End: 2024-04-04
Payer: COMMERCIAL

## 2024-04-04 VITALS
BODY MASS INDEX: 41.62 KG/M2 | TEMPERATURE: 98.8 F | HEART RATE: 73 BPM | HEIGHT: 73 IN | RESPIRATION RATE: 17 BRPM | OXYGEN SATURATION: 95 % | SYSTOLIC BLOOD PRESSURE: 123 MMHG | WEIGHT: 314 LBS | DIASTOLIC BLOOD PRESSURE: 65 MMHG

## 2024-04-04 DIAGNOSIS — R22.2 MASS OF CHEST WALL, RIGHT: ICD-10-CM

## 2024-04-04 DIAGNOSIS — R59.9 ENLARGED LYMPH NODES: Primary | ICD-10-CM

## 2024-04-04 PROCEDURE — 99214 OFFICE O/P EST MOD 30 MIN: CPT | Performed by: FAMILY MEDICINE

## 2024-04-04 NOTE — TELEPHONE ENCOUNTER
Call taken, patient notes his visit with [facility] provider was cancelled today (provider called in).   I see triage yesterday, needs to be seen within 3 days.    [facility].    Scheduled with [facility] provider today at 11:40 am arrival.    Patient verbalized understanding of and agreement with plan.    Kendal GAMINO RN  [facility] Triage

## 2024-04-04 NOTE — PROGRESS NOTES
"  Assessment & Plan     Enlarged lymph nodes  Needs US  - US Head Neck Soft Tissue; Future    Mass of chest wall, right  Pt given number to schedule  - MA Diagnostic Digital Bilateral; Future            BMI  Estimated body mass index is 41.43 kg/m  as calculated from the following:    Height as of this encounter: 1.854 m (6' 1\").    Weight as of this encounter: 142.4 kg (314 lb).   Weight management plan: Discussed healthy diet and exercise guidelines          Casey May is a 34 year old, presenting for the following health issues:  Neck Pain        4/4/2024    11:54 AM   Additional Questions   Roomed by sudarshan     History of Present Illness       Reason for visit:  Bulge in neck, neck/shoulder pain  Symptom onset:  1-2 weeks ago  Symptoms include:  Neck and shoulder pain  Symptom intensity:  Moderate  Symptom progression:  Staying the same  Had these symptoms before:  No  What makes it worse:  Turning my head to the left  What makes it better:  Ibuprofen    He eats 0-1 servings of fruits and vegetables daily.He consumes 1 sweetened beverage(s) daily.He exercises with enough effort to increase his heart rate 20 to 29 minutes per day.  He exercises with enough effort to increase his heart rate 4 days per week.   He is taking medications regularly.     Pt with area on left side of neck pops out when he lays down  Noticed it when he was laying down. It is firm, slightly tender  Did not notice it on other side  He noticed it this weekend    Pt with some mild palpable masses of right chest wall. Does not notice these masses on the left. Not sure how long they have been there. No axillary LAD    Review of Systems  Constitutional, HEENT, cardiovascular, pulmonary, gi and gu systems are negative, except as otherwise noted.      Objective    /65   Pulse 73   Temp 98.8  F (37.1  C) (Oral)   Resp 17   Ht 1.854 m (6' 1\")   Wt 142.4 kg (314 lb)   SpO2 95%   BMI 41.43 kg/m    Body mass index is 41.43 " kg/m .  Physical Exam   GENERAL: alert and no distress  NECK: cervical adenopathy left neck, no asymmetry, masses, or scars, and thyroid normal to palpation  BREAST: normal without masses, tenderness or nipple discharge, no palpable axillary masses or adenopathy, and mass right breast ropy type texture in outer quadrants    No results found for this or any previous visit (from the past 24 hour(s)).        Signed Electronically by: Suzan Dennis MD

## 2024-04-07 DIAGNOSIS — I10 BENIGN ESSENTIAL HYPERTENSION: ICD-10-CM

## 2024-04-08 ENCOUNTER — ANCILLARY PROCEDURE (OUTPATIENT)
Dept: ULTRASOUND IMAGING | Facility: CLINIC | Age: 34
End: 2024-04-08
Attending: FAMILY MEDICINE
Payer: COMMERCIAL

## 2024-04-08 DIAGNOSIS — R59.9 ENLARGED LYMPH NODES: ICD-10-CM

## 2024-04-08 PROCEDURE — 76536 US EXAM OF HEAD AND NECK: CPT | Mod: TC | Performed by: RADIOLOGY

## 2024-04-08 RX ORDER — LISINOPRIL/HYDROCHLOROTHIAZIDE 10-12.5 MG
1 TABLET ORAL DAILY
Qty: 90 TABLET | Refills: 1 | Status: SHIPPED | OUTPATIENT
Start: 2024-04-08 | End: 2024-08-26

## 2024-04-12 ENCOUNTER — ANCILLARY PROCEDURE (OUTPATIENT)
Dept: MAMMOGRAPHY | Facility: CLINIC | Age: 34
End: 2024-04-12
Attending: FAMILY MEDICINE
Payer: COMMERCIAL

## 2024-04-12 ENCOUNTER — ANCILLARY PROCEDURE (OUTPATIENT)
Dept: ULTRASOUND IMAGING | Facility: CLINIC | Age: 34
End: 2024-04-12
Attending: FAMILY MEDICINE
Payer: COMMERCIAL

## 2024-04-12 DIAGNOSIS — R22.2 MASS OF CHEST WALL, RIGHT: ICD-10-CM

## 2024-04-12 PROCEDURE — 76642 ULTRASOUND BREAST LIMITED: CPT | Mod: RT | Performed by: STUDENT IN AN ORGANIZED HEALTH CARE EDUCATION/TRAINING PROGRAM

## 2024-04-12 PROCEDURE — 77066 DX MAMMO INCL CAD BI: CPT | Performed by: STUDENT IN AN ORGANIZED HEALTH CARE EDUCATION/TRAINING PROGRAM

## 2024-04-16 ENCOUNTER — THERAPY VISIT (OUTPATIENT)
Dept: PHYSICAL THERAPY | Facility: CLINIC | Age: 34
End: 2024-04-16
Payer: COMMERCIAL

## 2024-04-16 DIAGNOSIS — M79.662 PAIN OF LEFT CALF: Primary | ICD-10-CM

## 2024-04-16 PROCEDURE — 97112 NEUROMUSCULAR REEDUCATION: CPT | Mod: GP | Performed by: PHYSICAL THERAPIST

## 2024-04-16 PROCEDURE — 97110 THERAPEUTIC EXERCISES: CPT | Mod: GP | Performed by: PHYSICAL THERAPIST

## 2024-05-01 ENCOUNTER — OFFICE VISIT (OUTPATIENT)
Dept: PEDIATRICS | Facility: CLINIC | Age: 34
End: 2024-05-01
Payer: COMMERCIAL

## 2024-05-01 ENCOUNTER — ANCILLARY PROCEDURE (OUTPATIENT)
Dept: CT IMAGING | Facility: CLINIC | Age: 34
End: 2024-05-01
Attending: INTERNAL MEDICINE
Payer: COMMERCIAL

## 2024-05-01 ENCOUNTER — OFFICE VISIT (OUTPATIENT)
Dept: URGENT CARE | Facility: URGENT CARE | Age: 34
End: 2024-05-01
Payer: COMMERCIAL

## 2024-05-01 VITALS
HEART RATE: 71 BPM | TEMPERATURE: 97.8 F | DIASTOLIC BLOOD PRESSURE: 78 MMHG | OXYGEN SATURATION: 98 % | SYSTOLIC BLOOD PRESSURE: 124 MMHG

## 2024-05-01 VITALS
TEMPERATURE: 98.3 F | BODY MASS INDEX: 41.53 KG/M2 | OXYGEN SATURATION: 99 % | DIASTOLIC BLOOD PRESSURE: 75 MMHG | RESPIRATION RATE: 15 BRPM | WEIGHT: 314.8 LBS | SYSTOLIC BLOOD PRESSURE: 113 MMHG | HEART RATE: 72 BPM

## 2024-05-01 DIAGNOSIS — R10.32 LLQ ABDOMINAL PAIN: Primary | ICD-10-CM

## 2024-05-01 DIAGNOSIS — R10.32 ABDOMINAL PAIN, LEFT LOWER QUADRANT: ICD-10-CM

## 2024-05-01 DIAGNOSIS — R10.9 FLANK PAIN: ICD-10-CM

## 2024-05-01 DIAGNOSIS — R10.32 ABDOMINAL PAIN, LEFT LOWER QUADRANT: Primary | ICD-10-CM

## 2024-05-01 LAB
ALBUMIN UR-MCNC: NEGATIVE MG/DL
ANION GAP SERPL CALCULATED.3IONS-SCNC: 11 MMOL/L (ref 7–15)
APPEARANCE UR: CLEAR
BASOPHILS # BLD AUTO: 0 10E3/UL (ref 0–0.2)
BASOPHILS NFR BLD AUTO: 1 %
BILIRUB UR QL STRIP: NEGATIVE
BUN SERPL-MCNC: 9.9 MG/DL (ref 6–20)
CALCIUM SERPL-MCNC: 9.7 MG/DL (ref 8.6–10)
CHLORIDE SERPL-SCNC: 99 MMOL/L (ref 98–107)
COLOR UR AUTO: COLORLESS
CREAT SERPL-MCNC: 0.89 MG/DL (ref 0.67–1.17)
CRP SERPL-MCNC: 5.82 MG/L
DEPRECATED HCO3 PLAS-SCNC: 28 MMOL/L (ref 22–29)
EGFRCR SERPLBLD CKD-EPI 2021: >90 ML/MIN/1.73M2
EOSINOPHIL # BLD AUTO: 0.4 10E3/UL (ref 0–0.7)
EOSINOPHIL NFR BLD AUTO: 4 %
ERYTHROCYTE [DISTWIDTH] IN BLOOD BY AUTOMATED COUNT: 13.1 % (ref 10–15)
GLUCOSE SERPL-MCNC: 88 MG/DL (ref 70–99)
GLUCOSE UR STRIP-MCNC: NEGATIVE MG/DL
HCT VFR BLD AUTO: 49.1 % (ref 40–53)
HGB BLD-MCNC: 16.6 G/DL (ref 13.3–17.7)
HGB UR QL STRIP: ABNORMAL
IMM GRANULOCYTES # BLD: 0 10E3/UL
IMM GRANULOCYTES NFR BLD: 0 %
KETONES UR STRIP-MCNC: NEGATIVE MG/DL
LEUKOCYTE ESTERASE UR QL STRIP: NEGATIVE
LYMPHOCYTES # BLD AUTO: 2.7 10E3/UL (ref 0.8–5.3)
LYMPHOCYTES NFR BLD AUTO: 32 %
MCH RBC QN AUTO: 29.3 PG (ref 26.5–33)
MCHC RBC AUTO-ENTMCNC: 33.8 G/DL (ref 31.5–36.5)
MCV RBC AUTO: 87 FL (ref 78–100)
MONOCYTES # BLD AUTO: 0.7 10E3/UL (ref 0–1.3)
MONOCYTES NFR BLD AUTO: 8 %
NEUTROPHILS # BLD AUTO: 4.7 10E3/UL (ref 1.6–8.3)
NEUTROPHILS NFR BLD AUTO: 55 %
NITRATE UR QL: NEGATIVE
NRBC # BLD AUTO: 0 10E3/UL
NRBC BLD AUTO-RTO: 0 /100
PH UR STRIP: 6 [PH] (ref 5–7)
PLATELET # BLD AUTO: 345 10E3/UL (ref 150–450)
POTASSIUM SERPL-SCNC: 4.3 MMOL/L (ref 3.4–5.3)
RBC # BLD AUTO: 5.66 10E6/UL (ref 4.4–5.9)
RBC #/AREA URNS AUTO: ABNORMAL /HPF
SODIUM SERPL-SCNC: 138 MMOL/L (ref 135–145)
SP GR UR STRIP: 1.01 (ref 1–1.03)
SQUAMOUS #/AREA URNS AUTO: ABNORMAL /LPF
UROBILINOGEN UR STRIP-MCNC: NORMAL MG/DL
WBC # BLD AUTO: 8.5 10E3/UL (ref 4–11)
WBC #/AREA URNS AUTO: ABNORMAL /HPF

## 2024-05-01 PROCEDURE — 85025 COMPLETE CBC W/AUTO DIFF WBC: CPT | Performed by: INTERNAL MEDICINE

## 2024-05-01 PROCEDURE — 81001 URINALYSIS AUTO W/SCOPE: CPT | Performed by: INTERNAL MEDICINE

## 2024-05-01 PROCEDURE — 36415 COLL VENOUS BLD VENIPUNCTURE: CPT | Performed by: INTERNAL MEDICINE

## 2024-05-01 PROCEDURE — 99214 OFFICE O/P EST MOD 30 MIN: CPT | Performed by: INTERNAL MEDICINE

## 2024-05-01 PROCEDURE — 86140 C-REACTIVE PROTEIN: CPT | Performed by: INTERNAL MEDICINE

## 2024-05-01 PROCEDURE — 80048 BASIC METABOLIC PNL TOTAL CA: CPT | Performed by: INTERNAL MEDICINE

## 2024-05-01 PROCEDURE — 99207 REFERRAL TO ACUTE AND DIAGNOSTIC SERVICES: CPT | Performed by: STUDENT IN AN ORGANIZED HEALTH CARE EDUCATION/TRAINING PROGRAM

## 2024-05-01 PROCEDURE — 74177 CT ABD & PELVIS W/CONTRAST: CPT | Mod: GC | Performed by: STUDENT IN AN ORGANIZED HEALTH CARE EDUCATION/TRAINING PROGRAM

## 2024-05-01 RX ORDER — IOPAMIDOL 755 MG/ML
122 INJECTION, SOLUTION INTRAVASCULAR ONCE
Status: COMPLETED | OUTPATIENT
Start: 2024-05-01 | End: 2024-05-01

## 2024-05-01 RX ADMIN — IOPAMIDOL 122 ML: 755 INJECTION, SOLUTION INTRAVASCULAR at 15:53

## 2024-05-01 NOTE — PROGRESS NOTES
Assessment & Plan     LLQ abdominal pain  Advised referral to [facility] in  for further evaluation with blood work, UA, CT abdomen/pelvis to r/o diverticulitis, kidney stone. Provider at [facility] agreed and will see patient at 3 pm.            No follow-ups on file.    Devora Bryant, [facility] [place]    Casey May is a 34 year old male who presents to clinic today for the following health issues:  Chief Complaint   Patient presents with    Abdominal Pain     Lt side pains, occasional sharp pains otherwise dull/achy pains. Sx about a week.      HPI      Review of Systems  Constitutional, HEENT, cardiovascular, pulmonary, GI, , musculoskeletal, neuro, skin, endocrine and psych systems are negative, except as otherwise noted.      Objective    /75 (BP Location: Left arm, Cuff Size: Adult Large)   Pulse 72   Temp 98.3  F (36.8  C) (Tympanic)   Resp 15   Wt 142.8 kg (314 lb 12.8 oz)   SpO2 99%   BMI 41.53 kg/m    Physical Exam   GENERAL: alert and no distress  ABDOMEN: tenderness LLQ  MS: no gross musculoskeletal defects noted, no edema  SKIN: no suspicious lesions or rashes  NEURO: Normal strength and tone, mentation intact and speech normal  PSYCH: mentation appears normal, affect normal/bright    No results found for this or any previous visit (from the past 24 hour(s)).

## 2024-05-01 NOTE — PROGRESS NOTES
Acute and Diagnostic Services Clinic Visit    ASSESSMENT:     1.  Left sided abdominal/flank pain, with no suggestion of genitourinary cause/stone.   Abdomality of jejunum suggested, likely cause for symptoms.  No suggestion of bowel obstruction.  No hernia on exam.  Unclear exact nature of problem, will await final reading from radiology.        PLAN:  1.  Clear liquid diet for 24 hours, advance to full liquid diet then if improving.  2.  In 48 hours, advance diet as tolerated if doing well  3.  Notify Dr. Uriarte if symptoms worsening or lack of improvement    Awaiting final reading of CT to confirm -->  no significant change, see report.  Patient reached 5/2/2024 and results discussed.  No real change in symptoms. No anorectal area symptoms.  Will continue present plan, for now.      Greater than 30 minutes were spent on chart review, patient care and assessment, and other management of this patient for this visit.           Casey May is a 34 year old, presenting for the following health issues:  Abdominal Pain (LLQ)    HPI     Abdominal/Flank Pain  Onset/Duration: 7-10 days  Description:   Character: Sharp when coughing , Dull ache  Location: left flank and lower quadrant  Radiation: Pelvic region a little bit, not to back  Intensity: mild, moderate  Progression of Symptoms:  worsening and waxing and waning  Accompanying Signs & Symptoms:  Fever/chills: no   Gas/Bloating: no   Nausea: no   Vomitting: no   Diarrhea: no   Constipation:no, having BM daily without blood, mucus  Dysuria: no            Hematuria: no            Frequency: no            Incontinence of urine: no   History:            Last bowel movement: today  Trauma: no, also no heavy lifting   Previous similar pain: no    Previous tests done: none           Previous Abdominal surgery: no   Precipitating factors:   Does the pain change with:     Food: no      Bowel Movement: YES, during BM    Urination: no, no genitourinary symptoms                Other factors: no   Therapies tried and outcome:  Ibuprofen 200-400 mg very little relief    When food last eaten: 4/30 PM    Will have more pain walking around, with coughing, standing up.   Hurts to lay on.   Was seen in urgent care and seemed tender LLQ.   Appetite fine.      Patient doing PT for his left leg, but not weights.   Mostly resistance bands.      No history of IBS, GI problems.  History of kidney stone.        Alcohol - infrequent  Tobacco - none        ROS otherwise negative       Objective    /78 (BP Location: Right arm, Patient Position: Sitting, Cuff Size: Adult Large)   Pulse 71   Temp 97.8  F (36.6  C) (Oral)   SpO2 98%   There is no height or weight on file to calculate BMI.  Physical Exam   GENERAL: alert and no distress, overweight  NECK: no adenopathy, no asymmetry, masses, or scars  RESP: lungs clear to auscultation - no rales, rhonchi or wheezes  CV: regular rate and rhythm, normal S1 S2, no S3 or S4, no murmur, click or rub, no peripheral edema  ABDOMEN: soft, no organomegaly or masses, and bowel sounds normal.   Moderate tenderness lateral left abdomen, more than LLQ.   No rebound/peritoneal signs.  No hernias appreciated.  MS: no gross musculoskeletal defects noted, no edema    Results for orders placed or performed in visit on 05/01/24 (from the past 24 hour(s))   CBC with platelets differential    Narrative    The following orders were created for panel order CBC with platelets differential.  Procedure                               Abnormality         Status                     ---------                               -----------         ------                     CBC with platelets and d...[[id number]]                      Final result                 Please view results for these tests on the individual orders.   CRP inflammation   Result Value Ref Range    CRP Inflammation 5.82 (H) <5.00 mg/L   Basic metabolic panel   Result Value Ref Range     Sodium 138 135 - 145 mmol/L    Potassium 4.3 3.4 - 5.3 mmol/L    Chloride 99 98 - 107 mmol/L    Carbon Dioxide (CO2) 28 22 - 29 mmol/L    Anion Gap 11 7 - 15 mmol/L    Urea Nitrogen 9.9 6.0 - 20.0 mg/dL    Creatinine 0.89 0.67 - 1.17 mg/dL    GFR Estimate >90 >60 mL/min/1.73m2    Calcium 9.7 8.6 - 10.0 mg/dL    Glucose 88 70 - 99 mg/dL   CBC with platelets and differential   Result Value Ref Range    WBC Count 8.5 4.0 - 11.0 10e3/uL    RBC Count 5.66 4.40 - 5.90 10e6/uL    Hemoglobin 16.6 13.3 - 17.7 g/dL    Hematocrit 49.1 40.0 - 53.0 %    MCV 87 78 - 100 fL    MCH 29.3 26.5 - 33.0 pg    MCHC 33.8 31.5 - 36.5 g/dL    RDW 13.1 10.0 - 15.0 %    Platelet Count 345 150 - 450 10e3/uL    % Neutrophils 55 %    % Lymphocytes 32 %    % Monocytes 8 %    % Eosinophils 4 %    % Basophils 1 %    % Immature Granulocytes 0 %    NRBCs per 100 WBC 0 <1 /100    Absolute Neutrophils 4.7 1.6 - 8.3 10e3/uL    Absolute Lymphocytes 2.7 0.8 - 5.3 10e3/uL    Absolute Monocytes 0.7 0.0 - 1.3 10e3/uL    Absolute Eosinophils 0.4 0.0 - 0.7 10e3/uL    Absolute Basophils 0.0 0.0 - 0.2 10e3/uL    Absolute Immature Granulocytes 0.0 <=0.4 10e3/uL    Absolute NRBCs 0.0 10e3/uL   UA with Microscopic reflex to Culture    Specimen: Urine, Clean Catch   Result Value Ref Range    Color Urine Colorless Colorless, Straw, Light Yellow, Yellow    Appearance Urine Clear Clear    Glucose Urine Negative Negative mg/dL    Bilirubin Urine Negative Negative    Ketones Urine Negative Negative mg/dL    Specific Gravity Urine 1.010 1.003 - 1.035    Blood Urine Trace (A) Negative    pH Urine 6.0 5.0 - 7.0    Protein Albumin Urine Negative Negative mg/dL    Nitrite Urine Negative Negative    Leukocyte Esterase Urine Negative Negative    Urobilinogen Urine Normal Normal, 2.0 mg/dL   UA Microscopic with Reflex to Culture   Result Value Ref Range    RBC Urine 0-2 0-2 /HPF /HPF    WBC Urine None Seen 0-5 /HPF /HPF    Squamous Epithelials Urine Few (A) None Seen /LPF     Narrative    Urine Culture not indicated         CT ABDOMEN   RESIDENT PRELIMINARY INTERPRETATION  IMPRESSION:  1.  Segment of mildly dilated jejunum measuring 3.3 cm without apparent obstruction. No surrounding inflammatory changes. Attention on follow-up.  2.  Rectal diverticula without evidence of acute diverticulitis.    Signed Electronically by: Jay Wilcox MD

## 2024-05-01 NOTE — Clinical Note
Interesting case yesterday, without an answer for his symptoms.   Still having symptoms today, and I don't have a diagnosis for him yet.

## 2024-05-01 NOTE — PATIENT INSTRUCTIONS
PLAN:  1.  Clear liquid diet for 24 hours, advance to full liquid diet then if improving.  2.  In 48 hours, advance diet as tolerated if doing well  3.  Notify Dr. Uriarte if symptoms worsening or lack of improvement    We will await final report of CT findings

## 2024-05-06 ENCOUNTER — ANCILLARY PROCEDURE (OUTPATIENT)
Dept: ULTRASOUND IMAGING | Facility: CLINIC | Age: 34
End: 2024-05-06
Attending: FAMILY MEDICINE
Payer: COMMERCIAL

## 2024-05-06 DIAGNOSIS — R59.9 ENLARGED LYMPH NODES: ICD-10-CM

## 2024-05-06 PROCEDURE — 76536 US EXAM OF HEAD AND NECK: CPT

## 2024-05-07 DIAGNOSIS — R59.9 ENLARGED LYMPH NODES: Primary | ICD-10-CM

## 2024-05-14 ENCOUNTER — THERAPY VISIT (OUTPATIENT)
Dept: PHYSICAL THERAPY | Facility: CLINIC | Age: 34
End: 2024-05-14
Payer: COMMERCIAL

## 2024-05-14 DIAGNOSIS — M79.662 PAIN OF LEFT CALF: Primary | ICD-10-CM

## 2024-05-14 PROCEDURE — 97112 NEUROMUSCULAR REEDUCATION: CPT | Mod: GP | Performed by: PHYSICAL THERAPIST

## 2024-05-14 PROCEDURE — 97110 THERAPEUTIC EXERCISES: CPT | Mod: GP | Performed by: PHYSICAL THERAPIST

## 2024-06-10 ENCOUNTER — ANCILLARY PROCEDURE (OUTPATIENT)
Dept: ULTRASOUND IMAGING | Facility: CLINIC | Age: 34
End: 2024-06-10
Attending: FAMILY MEDICINE
Payer: COMMERCIAL

## 2024-06-10 DIAGNOSIS — R59.9 ENLARGED LYMPH NODES: ICD-10-CM

## 2024-06-10 PROCEDURE — 76536 US EXAM OF HEAD AND NECK: CPT | Mod: TC | Performed by: RADIOLOGY

## 2024-06-12 ENCOUNTER — MYC MEDICAL ADVICE (OUTPATIENT)
Dept: FAMILY MEDICINE | Facility: CLINIC | Age: 34
End: 2024-06-12
Payer: COMMERCIAL

## 2024-06-12 DIAGNOSIS — R59.0 ENLARGED LYMPH NODE IN NECK: Primary | ICD-10-CM

## 2024-06-14 ENCOUNTER — THERAPY VISIT (OUTPATIENT)
Dept: PHYSICAL THERAPY | Facility: CLINIC | Age: 34
End: 2024-06-14
Payer: COMMERCIAL

## 2024-06-14 DIAGNOSIS — M79.662 PAIN OF LEFT CALF: Primary | ICD-10-CM

## 2024-06-14 PROCEDURE — 97112 NEUROMUSCULAR REEDUCATION: CPT | Mod: GP | Performed by: PHYSICAL THERAPIST

## 2024-06-14 PROCEDURE — 97110 THERAPEUTIC EXERCISES: CPT | Mod: GP | Performed by: PHYSICAL THERAPIST

## 2024-06-24 NOTE — TELEPHONE ENCOUNTER
FUTURE VISIT INFORMATION      FUTURE VISIT INFORMATION:  Date: 7/15/2024  Time: 9 AM  Location: [facility]  REFERRAL INFORMATION:  Referring provider: Dr. Dennis  Referring providers clinic: [facility]  Reason for visit/diagnosis: Enlarged lymph node in neck     RECORDS REQUESTED FROM:       Clinic name Comments Records Status Imaging Status   [facility] 6/12/24 - MyChart referral from Dr. Dennis  4/4/24 - [facility] OV with Dr. Dennis  12/27/23 - [facility] OV with Dr. Odonnell  11/13/17 - [facility] OV with Tatiana Wolfe NP [facility]    [facility] 6/10/24 - US Head/Neck  5/6/24 - US Head/Neck  4/8/24 - US Head/Neck [facility] PACs

## 2024-07-15 ENCOUNTER — OFFICE VISIT (OUTPATIENT)
Dept: OTOLARYNGOLOGY | Facility: CLINIC | Age: 34
End: 2024-07-15
Payer: COMMERCIAL

## 2024-07-15 ENCOUNTER — PRE VISIT (OUTPATIENT)
Dept: OTOLARYNGOLOGY | Facility: CLINIC | Age: 34
End: 2024-07-15

## 2024-07-15 VITALS
DIASTOLIC BLOOD PRESSURE: 83 MMHG | SYSTOLIC BLOOD PRESSURE: 139 MMHG | OXYGEN SATURATION: 95 % | HEART RATE: 75 BPM | WEIGHT: 315 LBS | BODY MASS INDEX: 41.75 KG/M2 | TEMPERATURE: 96.7 F | HEIGHT: 73 IN

## 2024-07-15 DIAGNOSIS — R59.0 ENLARGED LYMPH NODE IN NECK: Primary | ICD-10-CM

## 2024-07-15 PROCEDURE — 31575 DIAGNOSTIC LARYNGOSCOPY: CPT | Performed by: STUDENT IN AN ORGANIZED HEALTH CARE EDUCATION/TRAINING PROGRAM

## 2024-07-15 PROCEDURE — 99204 OFFICE O/P NEW MOD 45 MIN: CPT | Mod: 25 | Performed by: STUDENT IN AN ORGANIZED HEALTH CARE EDUCATION/TRAINING PROGRAM

## 2024-07-15 ASSESSMENT — PAIN SCALES - GENERAL: PAINLEVEL: MILD PAIN (3)

## 2024-07-15 NOTE — NURSING NOTE
"Chief Complaint   Patient presents with    Consult     Enlaged lymph node in neck     Blood pressure 139/83, pulse 75, temperature (!) 96.7  F (35.9  C), height 1.854 m (6' 1\"), weight 143.8 kg (317 lb), SpO2 95%.  Joey White LPN    "

## 2024-07-15 NOTE — LETTER
7/15/2024       RE: Lalo Hill  [address]  [place] [address]     Dear Colleague,    Thank you for referring your patient, Lalo Hill, to the [facility] [place] [facility]. Please see a copy of my visit note below.    Head and neck surgery  July 15, 2024    I the pleasure meeting Mr. Hill today in clinic    He is a pleasant 34-year-old male referred for evaluation of left cervical adenopathy    In April 2024 he noticed discomfort in the low left neck.  Subsequent ultrasound showed no pathology in that area however did show an enlarged left level 2 lymph node.  The lymph node was described as indeterminant and a fatty hilum was present.  He has had 2 additional ultrasounds in May and June which reports stability of the lymph node.    He has no head and neck symptoms.    He has no constitutional symptoms    Past medical history:  Hypertension    Past surgical history:  None    Medications:  Lisinopril hydrochlorothiazide  Metoprolol    Allergies:  Erythromycin    Social history:  Never smoker  Rare alcohol use  Works in software    Family history:  None    Physical examination:  Alert in no acute distress  No palpable cervical adenopathy  No lesions seen in the oral cavity or oropharynx  No suspicious skin lesions    Procedure:  Fiberoptic laryngoscopy was performed.  No concerning lesions were seen in the pharynx or larynx    Ultrasounds performed today showing bilateral symmetric level 2 lymph nodes.  There was a subtle fatty hilum present on the left    Assessment and plan:  34-year-old male with incidental enlarged left level 2 lymph node seen on ultrasound for low left neck discomfort.  The lymph node has been stable in April and there appears to be a subtle fatty hilum.  I think that the appearance is most consistent with a reactive lymph node which we often see in young people in this area    We discussed  options of continued observation versus fine-needle aspirate.  He prefers to continue observation which I support.  I like to get an updated ultrasound in approximately 6 months.  He knows to call me sooner if he develops any new head neck symptoms.    Awais Fallon MD    45 minutes spent on the date of the encounter in chart review, patient visit, review of tests, documentation and/or discussion with other providers about the issues documented above asides from time spent doing flexible laryngoscopy.     Again, thank you for allowing me to participate in the care of your patient.      Sincerely,    Awais Fallon MD

## 2024-07-15 NOTE — PROGRESS NOTES
Head and neck surgery  July 15, 2024    I the pleasure meeting Mr. Hill today in clinic    He is a pleasant 34-year-old male referred for evaluation of left cervical adenopathy    In April 2024 he noticed discomfort in the low left neck.  Subsequent ultrasound showed no pathology in that area however did show an enlarged left level 2 lymph node.  The lymph node was described as indeterminant and a fatty hilum was present.  He has had 2 additional ultrasounds in May and June which reports stability of the lymph node.    He has no head and neck symptoms.    He has no constitutional symptoms    Past medical history:  Hypertension    Past surgical history:  None    Medications:  Lisinopril hydrochlorothiazide  Metoprolol    Allergies:  Erythromycin    Social history:  Never smoker  Rare alcohol use  Works in software    Family history:  None    Physical examination:  Alert in no acute distress  No palpable cervical adenopathy  No lesions seen in the oral cavity or oropharynx  No suspicious skin lesions    Procedure:  Fiberoptic laryngoscopy was performed.  No concerning lesions were seen in the pharynx or larynx    Ultrasounds performed today showing bilateral symmetric level 2 lymph nodes.  There was a subtle fatty hilum present on the left    Assessment and plan:  34-year-old male with incidental enlarged left level 2 lymph node seen on ultrasound for low left neck discomfort.  The lymph node has been stable in April and there appears to be a subtle fatty hilum.  I think that the appearance is most consistent with a reactive lymph node which we often see in young people in this area    We discussed options of continued observation versus fine-needle aspirate.  He prefers to continue observation which I support.  I like to get an updated ultrasound in approximately 6 months.  He knows to call me sooner if he develops any new head neck symptoms.    Awais Fallon MD    45 minutes spent on the date of the encounter in  chart review, patient visit, review of tests, documentation and/or discussion with other providers about the issues documented above asides from time spent doing flexible laryngoscopy.

## 2024-07-15 NOTE — PATIENT INSTRUCTIONS
You were seen in the ENT Clinic today by Dr. Fallon. If you have any questions or concerns after your appointment, please contact us (see below)    The following has been recommended for you based upon your appointment today:  Neck ultrasound in 6 months     How to Contact Us:  Send a [employer] message to your provider. Our team will respond to you via [employer]. Occasionally, we will need to call you to get further information.  For urgent matters (Monday-Friday), call the ENT Clinic: [phone] and speak with a call center team member - they will route your call appropriately.   If you'd like to speak directly with a nurse, please find our contact information below. We do our best to check voicemail frequently throughout the day, and will work to call you back within 1-2 days. For urgent matters, please use the general clinic phone numbers listed above.    Chiqui DIOP RN, BSN  RN Care Coordinator, ENT Clinic  [facility]  Direct: [phone]

## 2024-07-16 ENCOUNTER — TELEPHONE (OUTPATIENT)
Dept: OTOLARYNGOLOGY | Facility: CLINIC | Age: 34
End: 2024-07-16
Payer: COMMERCIAL

## 2024-07-30 ENCOUNTER — THERAPY VISIT (OUTPATIENT)
Dept: PHYSICAL THERAPY | Facility: CLINIC | Age: 34
End: 2024-07-30
Payer: COMMERCIAL

## 2024-07-30 DIAGNOSIS — M79.662 PAIN OF LEFT CALF: Primary | ICD-10-CM

## 2024-07-30 PROCEDURE — 97110 THERAPEUTIC EXERCISES: CPT | Mod: GP | Performed by: PHYSICAL THERAPIST

## 2024-08-26 DIAGNOSIS — I10 BENIGN ESSENTIAL HYPERTENSION: ICD-10-CM

## 2024-08-26 RX ORDER — LISINOPRIL/HYDROCHLOROTHIAZIDE 10-12.5 MG
1 TABLET ORAL DAILY
Qty: 90 TABLET | Refills: 1 | Status: SHIPPED | OUTPATIENT
Start: 2024-08-26

## 2024-08-26 NOTE — TELEPHONE ENCOUNTER
Patient calling for refill- Pharmacy told patient he was a week too early. Med and Pharmacy pending.          Scout Mcintosh

## 2024-09-18 NOTE — PROGRESS NOTES
"  Assessment & Plan     Pain of left calf  Muscle cramps  Numbness and tingling of foot  New   - Magnesium; Future  - D dimer, quantitative  Low suspicion for clot although patient has had recent longer travel in a car so will obtain d-dimer. Is generally active, normal exam today showing normal sensation, normal strength.  No obvious edema, erythema.   Some mild muscle body tenderness of the calf, suspect mild strain at this time.  Conservative cares discussed, red flags to return discussed        BMI  Estimated body mass index is 40.9 kg/m  as calculated from the following:    Height as of this encounter: 1.854 m (6' 1\").    Weight as of this encounter: 140.6 kg (310 lb).   Weight management plan: Discussed healthy diet and exercise guidelines      Follow up if worsening    Subjective   Lalo is a 34 year old, presenting for the following health issues:  Foot Problems          9/25/2024     7:53 AM   Additional Questions   Roomed by Amanda Patel MA   Accompanied by Self     History of Present Illness       Reason for visit:  Pins and needles in foot  Symptom onset:  3-7 days ago  Symptom intensity:  Moderate  Symptom progression:  Staying the same  Had these symptoms before:  Yes  Has tried/received treatment for these symptoms:  No   He is taking medications regularly.       Review of Systems  Constitutional, neuro, ENT, endocrine, pulmonary, cardiac, gastrointestinal, genitourinary, musculoskeletal, integument and psychiatric systems are negative, except as otherwise noted.        Objective    /80   Pulse 78   Temp 97.4  F (36.3  C) (Tympanic)   Resp 16   Ht 1.854 m (6' 1\")   Wt 140.6 kg (310 lb)   SpO2 97%   BMI 40.90 kg/m    Body mass index is 40.9 kg/m .      Physical Exam   GENERAL: alert and no distress  CV: regular rate and rhythm, normal S1 S2, no S3 or S4, no murmur, click or rub, no peripheral edema  MS: no gross musculoskeletal defects noted, no edema. Mild tenderness muscle body of left " calf on palpation. ROM intact, strength intact   SKIN: no suspicious lesions or rashes  NEURO: Normal strength and tone, mentation intact and speech normal        Signed Electronically by: Justin Antoine PA-C

## 2024-09-25 ENCOUNTER — OFFICE VISIT (OUTPATIENT)
Dept: FAMILY MEDICINE | Facility: CLINIC | Age: 34
End: 2024-09-25
Payer: COMMERCIAL

## 2024-09-25 VITALS
TEMPERATURE: 97.4 F | RESPIRATION RATE: 16 BRPM | HEIGHT: 73 IN | BODY MASS INDEX: 41.08 KG/M2 | OXYGEN SATURATION: 97 % | SYSTOLIC BLOOD PRESSURE: 131 MMHG | WEIGHT: 310 LBS | DIASTOLIC BLOOD PRESSURE: 80 MMHG | HEART RATE: 78 BPM

## 2024-09-25 DIAGNOSIS — R20.2 NUMBNESS AND TINGLING OF FOOT: ICD-10-CM

## 2024-09-25 DIAGNOSIS — R20.0 NUMBNESS AND TINGLING OF FOOT: ICD-10-CM

## 2024-09-25 DIAGNOSIS — R25.2 MUSCLE CRAMPS: ICD-10-CM

## 2024-09-25 DIAGNOSIS — M79.662 PAIN OF LEFT CALF: Primary | ICD-10-CM

## 2024-09-25 LAB
D DIMER PPP FEU-MCNC: <0.27 UG/ML FEU (ref 0–0.5)
MAGNESIUM SERPL-MCNC: 2 MG/DL (ref 1.7–2.3)

## 2024-09-25 PROCEDURE — 83735 ASSAY OF MAGNESIUM: CPT | Performed by: PHYSICIAN ASSISTANT

## 2024-09-25 PROCEDURE — 99214 OFFICE O/P EST MOD 30 MIN: CPT | Performed by: PHYSICIAN ASSISTANT

## 2024-09-25 PROCEDURE — 85379 FIBRIN DEGRADATION QUANT: CPT | Performed by: PHYSICIAN ASSISTANT

## 2024-09-25 PROCEDURE — 36415 COLL VENOUS BLD VENIPUNCTURE: CPT | Performed by: PHYSICIAN ASSISTANT

## 2024-09-25 ASSESSMENT — PAIN SCALES - GENERAL: PAINLEVEL: NO PAIN (0)

## 2024-09-30 ENCOUNTER — MYC MEDICAL ADVICE (OUTPATIENT)
Dept: SLEEP MEDICINE | Facility: CLINIC | Age: 34
End: 2024-09-30
Payer: COMMERCIAL

## 2024-09-30 DIAGNOSIS — G47.33 OSA (OBSTRUCTIVE SLEEP APNEA): Primary | ICD-10-CM

## 2024-10-03 NOTE — TELEPHONE ENCOUNTER
CPAP DME order signed.  No synopsis data.   Follow-up 1 more year. Message sent  Bennett Goltz, PA-C

## 2024-10-03 NOTE — TELEPHONE ENCOUNTER
Patient requesting order for CPAP supplies, LOV 10/13/23.  Pended, message sent to request follow up appointment.

## 2024-10-20 SDOH — HEALTH STABILITY: PHYSICAL HEALTH: ON AVERAGE, HOW MANY MINUTES DO YOU ENGAGE IN EXERCISE AT THIS LEVEL?: 30 MIN

## 2024-10-20 SDOH — HEALTH STABILITY: PHYSICAL HEALTH: ON AVERAGE, HOW MANY DAYS PER WEEK DO YOU ENGAGE IN MODERATE TO STRENUOUS EXERCISE (LIKE A BRISK WALK)?: 5 DAYS

## 2024-10-20 ASSESSMENT — SOCIAL DETERMINANTS OF HEALTH (SDOH): HOW OFTEN DO YOU GET TOGETHER WITH FRIENDS OR RELATIVES?: THREE TIMES A WEEK

## 2024-10-21 ENCOUNTER — OFFICE VISIT (OUTPATIENT)
Dept: FAMILY MEDICINE | Facility: CLINIC | Age: 34
End: 2024-10-21
Payer: COMMERCIAL

## 2024-10-21 VITALS
HEIGHT: 73 IN | OXYGEN SATURATION: 97 % | BODY MASS INDEX: 41.03 KG/M2 | TEMPERATURE: 97.1 F | DIASTOLIC BLOOD PRESSURE: 80 MMHG | HEART RATE: 85 BPM | SYSTOLIC BLOOD PRESSURE: 126 MMHG | WEIGHT: 309.6 LBS | RESPIRATION RATE: 20 BRPM

## 2024-10-21 DIAGNOSIS — Z00.00 ROUTINE GENERAL MEDICAL EXAMINATION AT A HEALTH CARE FACILITY: Primary | ICD-10-CM

## 2024-10-21 DIAGNOSIS — I47.10 SVT (SUPRAVENTRICULAR TACHYCARDIA) (H): ICD-10-CM

## 2024-10-21 LAB
ALBUMIN SERPL BCG-MCNC: 4.5 G/DL (ref 3.5–5.2)
ALP SERPL-CCNC: 77 U/L (ref 40–150)
ALT SERPL W P-5'-P-CCNC: 27 U/L (ref 0–70)
ANION GAP SERPL CALCULATED.3IONS-SCNC: 11 MMOL/L (ref 7–15)
AST SERPL W P-5'-P-CCNC: 37 U/L (ref 0–45)
BASOPHILS # BLD AUTO: 0 10E3/UL (ref 0–0.2)
BASOPHILS NFR BLD AUTO: 1 %
BILIRUB SERPL-MCNC: 0.7 MG/DL
BUN SERPL-MCNC: 12.5 MG/DL (ref 6–20)
CALCIUM SERPL-MCNC: 9.6 MG/DL (ref 8.8–10.4)
CHLORIDE SERPL-SCNC: 102 MMOL/L (ref 98–107)
CHOLEST SERPL-MCNC: 197 MG/DL
CREAT SERPL-MCNC: 0.95 MG/DL (ref 0.67–1.17)
EGFRCR SERPLBLD CKD-EPI 2021: >90 ML/MIN/1.73M2
EOSINOPHIL # BLD AUTO: 0.4 10E3/UL (ref 0–0.7)
EOSINOPHIL NFR BLD AUTO: 6 %
ERYTHROCYTE [DISTWIDTH] IN BLOOD BY AUTOMATED COUNT: 12.8 % (ref 10–15)
EST. AVERAGE GLUCOSE BLD GHB EST-MCNC: 105 MG/DL
FASTING STATUS PATIENT QL REPORTED: YES
FASTING STATUS PATIENT QL REPORTED: YES
GLUCOSE SERPL-MCNC: 92 MG/DL (ref 70–99)
HBA1C MFR BLD: 5.3 % (ref 0–5.6)
HCO3 SERPL-SCNC: 25 MMOL/L (ref 22–29)
HCT VFR BLD AUTO: 48.2 % (ref 40–53)
HDLC SERPL-MCNC: 39 MG/DL
HGB BLD-MCNC: 16.2 G/DL (ref 13.3–17.7)
IMM GRANULOCYTES # BLD: 0 10E3/UL
IMM GRANULOCYTES NFR BLD: 0 %
LDLC SERPL CALC-MCNC: 136 MG/DL
LYMPHOCYTES # BLD AUTO: 1.9 10E3/UL (ref 0.8–5.3)
LYMPHOCYTES NFR BLD AUTO: 28 %
MCH RBC QN AUTO: 29.1 PG (ref 26.5–33)
MCHC RBC AUTO-ENTMCNC: 33.6 G/DL (ref 31.5–36.5)
MCV RBC AUTO: 87 FL (ref 78–100)
MONOCYTES # BLD AUTO: 0.6 10E3/UL (ref 0–1.3)
MONOCYTES NFR BLD AUTO: 8 %
NEUTROPHILS # BLD AUTO: 3.9 10E3/UL (ref 1.6–8.3)
NEUTROPHILS NFR BLD AUTO: 57 %
NONHDLC SERPL-MCNC: 158 MG/DL
PLATELET # BLD AUTO: 314 10E3/UL (ref 150–450)
POTASSIUM SERPL-SCNC: 4.2 MMOL/L (ref 3.4–5.3)
PROT SERPL-MCNC: 7.8 G/DL (ref 6.4–8.3)
RBC # BLD AUTO: 5.56 10E6/UL (ref 4.4–5.9)
SODIUM SERPL-SCNC: 138 MMOL/L (ref 135–145)
TRIGL SERPL-MCNC: 111 MG/DL
WBC # BLD AUTO: 6.9 10E3/UL (ref 4–11)

## 2024-10-21 PROCEDURE — 83036 HEMOGLOBIN GLYCOSYLATED A1C: CPT | Performed by: FAMILY MEDICINE

## 2024-10-21 PROCEDURE — 80061 LIPID PANEL: CPT | Performed by: FAMILY MEDICINE

## 2024-10-21 PROCEDURE — 99395 PREV VISIT EST AGE 18-39: CPT | Mod: 25 | Performed by: FAMILY MEDICINE

## 2024-10-21 PROCEDURE — 90471 IMMUNIZATION ADMIN: CPT | Performed by: FAMILY MEDICINE

## 2024-10-21 PROCEDURE — 80053 COMPREHEN METABOLIC PANEL: CPT | Performed by: FAMILY MEDICINE

## 2024-10-21 PROCEDURE — 85025 COMPLETE CBC W/AUTO DIFF WBC: CPT | Performed by: FAMILY MEDICINE

## 2024-10-21 PROCEDURE — 36415 COLL VENOUS BLD VENIPUNCTURE: CPT | Performed by: FAMILY MEDICINE

## 2024-10-21 PROCEDURE — 90656 IIV3 VACC NO PRSV 0.5 ML IM: CPT | Performed by: FAMILY MEDICINE

## 2024-10-21 ASSESSMENT — PAIN SCALES - GENERAL: PAINLEVEL: NO PAIN (0)

## 2024-10-21 NOTE — PATIENT INSTRUCTIONS
Patient Education   Preventive Care Advice   This is general advice given by our system to help you stay healthy. However, your care team may have specific advice just for you. Please talk to your care team about your preventive care needs.  Nutrition  Eat 5 or more servings of fruits and vegetables each day.  Try wheat bread, brown rice and whole grain pasta (instead of white bread, rice, and pasta).  Get enough calcium and vitamin D. Check the label on foods and aim for 100% of the RDA (recommended daily allowance).  Lifestyle  Exercise at least 150 minutes each week  (30 minutes a day, 5 days a week).  Do muscle strengthening activities 2 days a week. These help control your weight and prevent disease.  No smoking.  Wear sunscreen to prevent skin cancer.  Have a dental exam and cleaning every 6 months.  Yearly exams  See your health care team every year to talk about:  Any changes in your health.  Any medicines your care team has prescribed.  Preventive care, family planning, and ways to prevent chronic diseases.  Shots (vaccines)   HPV shots (up to age 26), if you've never had them before.  Hepatitis B shots (up to age 59), if you've never had them before.  COVID-19 shot: Get this shot when it's due.  Flu shot: Get a flu shot every year.  Tetanus shot: Get a tetanus shot every 10 years.  Pneumococcal, hepatitis A, and RSV shots: Ask your care team if you need these based on your risk.  Shingles shot (for age 50 and up)  General health tests  Diabetes screening:  Starting at age 35, Get screened for diabetes at least every 3 years.  If you are younger than age 35, ask your care team if you should be screened for diabetes.  Cholesterol test: At age 39, start having a cholesterol test every 5 years, or more often if advised.  Bone density scan (DEXA): At age 50, ask your care team if you should have this scan for osteoporosis (brittle bones).  Hepatitis C: Get tested at least once in your life.  STIs (sexually  transmitted infections)  Before age 24: Ask your care team if you should be screened for STIs.  After age 24: Get screened for STIs if you're at risk. You are at risk for STIs (including HIV) if:  You are sexually active with more than one person.  You don't use condoms every time.  You or a partner was diagnosed with a sexually transmitted infection.  If you are at risk for HIV, ask about PrEP medicine to prevent HIV.  Get tested for HIV at least once in your life, whether you are at risk for HIV or not.  Cancer screening tests  Cervical cancer screening: If you have a cervix, begin getting regular cervical cancer screening tests starting at age 21.  Breast cancer scan (mammogram): If you've ever had breasts, begin having regular mammograms starting at age 40. This is a scan to check for breast cancer.  Colon cancer screening: It is important to start screening for colon cancer at age 45.  Have a colonoscopy test every 10 years (or more often if you're at risk) Or, ask your provider about stool tests like a FIT test every year or Cologuard test every 3 years.  To learn more about your testing options, visit:   .  For help making a decision, visit:   https://bit.ly/fw69082.  Prostate cancer screening test: If you have a prostate, ask your care team if a prostate cancer screening test (PSA) at age 55 is right for you.  Lung cancer screening: If you are a current or former smoker ages 50 to 80, ask your care team if ongoing lung cancer screenings are right for you.  For informational purposes only. Not to replace the advice of your health care provider. Copyright   2023 [employer]. All rights reserved. Clinically reviewed by the [facility]. [employer] [id number] - REV 01/24.

## 2024-10-21 NOTE — PROGRESS NOTES
Preventive Care Visit  [facility]  Eliza Uriarte MD, Family Medicine  Oct 21, 2024      Assessment & Plan     Routine general medical examination at a health care facility    Health maintenance screening and immunizations reviewed with the patient.    We discussed healthy lifestyle, nutrition, cardiovascular risk reduction.  - Screen Labs ordered   - Continue great active lifestyle  - Follow up yearly for the annual physical and preventive care.    - CBC with Platelets & Differential; Future  - Comprehensive metabolic panel; Future  - Hemoglobin A1c; Future  - Lipid panel reflex to direct LDL Fasting; Future  - CBC with Platelets & Differential  - Comprehensive metabolic panel  - Hemoglobin A1c  - Lipid panel reflex to direct LDL Fasting    SVT (supraventricular tachycardia) (H)  Stable, no current symptoms.  Continue to monitor    BMI 40.0-44.9, adult (H)  Body mass index is 40.57 kg/m .  Wt Readings from Last 4 Encounters:   10/21/24 140.4 kg (309 lb 9.6 oz)   09/25/24 140.6 kg (310 lb)   07/15/24 143.8 kg (317 lb)   05/01/24 142.8 kg (314 lb 12.8 oz)   Discussed diet and exercise.  He lost about 8 pounds since last visit  Continue to work on lifestyle changes.            Counseling  Appropriate preventive services were addressed with this patient via screening, questionnaire, or discussion as appropriate for fall prevention, nutrition, physical activity, Tobacco-use cessation, social engagement, weight loss and cognition.  Checklist reviewing preventive services available has been given to the patient.  Reviewed patient's diet, addressing concerns and/or questions.       Work on weight loss  Regular exercise    Casey May is a 34 year old, presenting for the following:  Physical        10/21/2024     8:57 AM   Additional Questions   Roomed by Pat LOVE   Accompanied by self        Health Care Directive  Patient does not have a Health Care Directive or Living Will: Discussed  advance care planning with patient; however, patient declined at this time.    HPI  Fasting visit    Neck has been bothering -     Lower left leg bothering as well    Wt Readings from Last 4 Encounters:   10/21/24 140.4 kg (309 lb 9.6 oz)   09/25/24 140.6 kg (310 lb)   07/15/24 143.8 kg (317 lb)   05/01/24 142.8 kg (314 lb 12.8 oz)   More active   Lost 8 pounds since last visit       Preventive -     Immunization History   Administered Date(s) Administered    COVID-19 12+ (Pfizer) 10/20/2023    COVID-19 Bivalent 18+ (Moderna) 09/26/2022    COVID-19 Monovalent 18+ (Moderna) 04/06/2021, 05/04/2021, 01/26/2022    DTAP (<7y) 1991, 1991, 06/17/1992, 08/09/1992, 04/07/1995    HEPA 09/15/2008, 08/03/2009    HEPATITIS A (PEDS 12M-18Y) 09/15/2008, 08/03/2009    HIB (PRP-T) 1991, 1991, 06/17/1992    HIB, Unspecified 1991, 1991, 06/17/1992    HepB 08/01/1996, 09/16/1996, 04/01/1997    Hepatitis A (ADULT 19+) 09/15/2008, 08/03/2009    Hepatitis B, Adult 09/16/1996, 04/01/1997    Hepatitis B, Peds 08/01/1996, 09/16/1996, 04/01/1997    Influenza (IIV3) PF 10/26/2002, 12/11/2003, 12/28/2004, 11/12/2005    Influenza Vaccine >6 months,quad, PF 11/15/2017, 01/24/2019, 01/26/2022, 09/26/2022, 10/20/2023    Influenza, Split Virus, Trivalent, Pf (Fluzone\Fluarix) 10/21/2024    Influenza, seasonal, injectable, PF 11/11/2006, 10/27/2007, 11/20/2008    MMR 02/26/1992, 08/05/1997    Meningococcal ACWY (Menactra ) 08/03/2009    Meningococcal Mcv4 Conjugate,unspecified  08/03/2009    Poliovirus, inactivated (IPV) 1991, 1991, 1991, 04/07/1995    TD,PF 7+ (Tenivac) 02/17/2003    TDAP Vaccine (Adacel) 08/24/2017         - Colon CA screen: Colonoscopy, age 45-75 every 10 years or FIT every year or Cologuard every 3 years     No fam hx of colon cancer       -lipids screen: ordered     Diabetes screen: ordered            10/20/2024   General Health   How would you rate your overall physical  health? Good   Feel stress (tense, anxious, or unable to sleep) To some extent      (!) STRESS CONCERN      10/20/2024   Nutrition   Three or more servings of calcium each day? (!) I DON'T KNOW   Diet: Regular (no restrictions)   How many servings of fruit and vegetables per day? (!) 0-1   How many sweetened beverages each day? 0-1            10/20/2024   Exercise   Days per week of moderate/strenous exercise 5 days   Average minutes spent exercising at this level 30 min            10/20/2024   Social Factors   Frequency of gathering with friends or relatives Three times a week   Worry food won't last until get money to buy more No   Food not last or not have enough money for food? No   Do you have housing? (Housing is defined as stable permanent housing and does not include staying ouside in a car, in a tent, in an abandoned building, in an overnight shelter, or couch-surfing.) No   Are you worried about losing your housing? No   Lack of transportation? No   Unable to get utilities (heat,electricity)? No   Want help with housing or utility concern? No      (!) HOUSING CONCERN PRESENT      10/20/2024   Dental   Dentist two times every year? Yes            10/20/2024   TB Screening   Were you born outside of the [place]? No              Today's PHQ-2 Score:       7/15/2024     9:04 AM   PHQ-2 ( 1999 Pfizer)   Q1: Little interest or pleasure in doing things 0   Q2: Feeling down, depressed or hopeless 0   PHQ-2 Score 0         10/20/2024   Substance Use   Alcohol more than 3/day or more than 7/wk No   Do you use any other substances recreationally? No        Social History     Tobacco Use    Smoking status: Never    Smokeless tobacco: Never   Vaping Use    Vaping status: Never Used   Substance Use Topics    Alcohol use: Yes     Alcohol/week: 2.0 standard drinks of alcohol     Types: 2 Cans of beer per week     Comment: rare, not even monthly    Drug use: No             10/20/2024   One time HIV Screening   Previous HIV  test? No          10/20/2024   STI Screening   New sexual partner(s) since last STI/HIV test? No            10/20/2024   Contraception/Family Planning   Questions about contraception or family planning No           Reviewed and updated as needed this visit by Provider                    Past Medical History:   Diagnosis Date    * * * SBE PROPHYLAXIS * * *     Bicuspid aortic valve/needs prophylaxis for dental procedures    Hypertension     SVT (supraventricular tachycardia) (H)     SVT (supraventricular tachycardia) (H) 9/26/2022     No past surgical history on file.  Lab work is in process  BP Readings from Last 3 Encounters:   10/21/24 126/80   09/25/24 131/80   07/15/24 139/83    Wt Readings from Last 3 Encounters:   10/21/24 140.4 kg (309 lb 9.6 oz)   09/25/24 140.6 kg (310 lb)   07/15/24 143.8 kg (317 lb)                  Patient Active Problem List   Diagnosis    CARDIOVASCULAR SCREENING; LDL GOAL LESS THAN 160    Drusen (degenerative) of retina    Elevated blood pressure reading without diagnosis of hypertension    Bicuspid aortic valve    Nonintractable headache, unspecified chronicity pattern, unspecified headache type    Class 3 severe obesity due to excess calories with serious comorbidity and body mass index (BMI) of 40.0 to 44.9 in adult (H)    SVT (supraventricular tachycardia) (H)    JOSE (obstructive sleep apnea)    Nocturnal hypoxemia    Trochanteric bursitis of left hip    Pain of left calf     No past surgical history on file.    Social History     Tobacco Use    Smoking status: Never    Smokeless tobacco: Never   Substance Use Topics    Alcohol use: Yes     Alcohol/week: 2.0 standard drinks of alcohol     Types: 2 Cans of beer per week     Comment: rare, not even monthly     Family History   Problem Relation Age of Onset    Hypertension Father     Cancer Maternal Grandmother     C.A.D. Maternal Grandfather     Cerebrovascular Disease Maternal Grandfather     Prostate Cancer Maternal Grandfather   "   C.A.D. Paternal Grandmother     Blood Disease Paternal Grandmother     C.A.D. Paternal Grandfather     Cerebrovascular Disease Paternal Grandfather     Hypertension Paternal Grandfather     Prostate Cancer Paternal Grandfather          Current Outpatient Medications   Medication Sig Dispense Refill    lisinopril-hydrochlorothiazide (ZESTORETIC) 10-12.5 MG tablet Take 1 tablet by mouth daily. 90 tablet 1    metoprolol succinate ER (TOPROL XL) 100 MG 24 hr tablet            Review of Systems  Constitutional, HEENT, cardiovascular, pulmonary, gi and gu systems are negative, except as otherwise noted.     Objective    Exam  There were no vitals taken for this visit.   Estimated body mass index is 40.9 kg/m  as calculated from the following:    Height as of 9/25/24: 1.854 m (6' 1\").    Weight as of 9/25/24: 140.6 kg (310 lb).    Physical Exam  GENERAL: alert and no distress  NECK: no adenopathy, no asymmetry, masses, or scars  RESP: lungs clear to auscultation - no rales, rhonchi or wheezes  CV: regular rate and rhythm, normal S1 S2, no S3 or S4, no murmur, click or rub, no peripheral edema  ABDOMEN: soft, nontender, no hepatosplenomegaly, no masses and bowel sounds normal  MS: no gross musculoskeletal defects noted, no edema        Signed Electronically by: Eliza Uriarte MD    "

## 2024-11-01 ENCOUNTER — TELEPHONE (OUTPATIENT)
Dept: OTOLARYNGOLOGY | Facility: CLINIC | Age: 34
End: 2024-11-01
Payer: COMMERCIAL

## 2025-01-09 ENCOUNTER — OFFICE VISIT (OUTPATIENT)
Dept: FAMILY MEDICINE | Facility: CLINIC | Age: 35
End: 2025-01-09
Payer: COMMERCIAL

## 2025-01-09 VITALS
WEIGHT: 315 LBS | HEART RATE: 75 BPM | SYSTOLIC BLOOD PRESSURE: 133 MMHG | HEIGHT: 73 IN | TEMPERATURE: 98.1 F | OXYGEN SATURATION: 97 % | DIASTOLIC BLOOD PRESSURE: 76 MMHG | BODY MASS INDEX: 41.75 KG/M2 | RESPIRATION RATE: 16 BRPM

## 2025-01-09 DIAGNOSIS — I10 BENIGN ESSENTIAL HYPERTENSION: ICD-10-CM

## 2025-01-09 DIAGNOSIS — M79.662 PAIN IN BOTH LOWER LEGS: Primary | ICD-10-CM

## 2025-01-09 DIAGNOSIS — M79.661 PAIN IN BOTH LOWER LEGS: Primary | ICD-10-CM

## 2025-01-09 RX ORDER — GABAPENTIN 100 MG/1
CAPSULE ORAL
Qty: 40 CAPSULE | Refills: 1 | Status: SHIPPED | OUTPATIENT
Start: 2025-01-09

## 2025-01-09 RX ORDER — LISINOPRIL AND HYDROCHLOROTHIAZIDE 10; 12.5 MG/1; MG/1
1 TABLET ORAL DAILY
Qty: 90 TABLET | Refills: 1 | Status: SHIPPED | OUTPATIENT
Start: 2025-01-09

## 2025-01-09 ASSESSMENT — ENCOUNTER SYMPTOMS: LEG PAIN: 1

## 2025-01-09 NOTE — PROGRESS NOTES
"  ASSESSMENT / PLAN:  (M79.661,  M79.662) Pain in both lower legs  (primary encounter diagnosis)  Comment: nerve impingement for muscle/tendon strain, thyroid?  Plan: gabapentin (NEURONTIN) 100 MG capsule, Physical        Therapy  Referral, Adult Neurology          Referral        Will have p.t. see for stretching/exam/etc. And ask for neurology input. Trial of gabapentin and icy hot. Reveiwed risks and side effects of medication  Expected course and warning signs reviewed. Call/email with questions/concerns      (Z68.41) BMI 40.0-44.9, adult (H)  Comment: needs help  Plan: continue exercise and lower carb diet    (I10) Benign essential hypertension  Comment: stable needs refill   Plan: lisinopril-hydrochlorothiazide (ZESTORETIC)         10-12.5 MG tablet        Follow-up per pmd chest pain or shortness of breath to er. Consider higher dosage of hydrochlorothiazide?      Casey May is a 34 year old, presenting for the following health issues:  Leg Pain (Pt  left leg, swelling and pain , calf is being \"stretched and burning\")  Bilateral leg edema/pain.   History htn and morbid obesity. Normal cmp/cbc/hgb1c 3 months ago.  Works IT.  Patient had bilateral ultrasound LOWER EXTREMETIES negative DVT.   Upper pain pressure sensation and lower more burning sensation.    Possibly a little a little worse with prolonged sitting.   Some knee pain at time left. Thighs and hips ok.    Some swelling prolonged siting.   Exercise more. Some lower back issues. No known disc issues.   Seen p.t.in past - worked on back  and ankle. Not more on calfs.   No issues sleep. Constant pain - on/off. Stretching can helpful.   No ice/hot  or pain meds. No topicals. No  wrapping.       1/9/2025    11:40 AM   Additional Questions   Roomed by Anna   Accompanied by self         1/9/2025    11:40 AM   Patient Reported Additional Medications   Patient reports taking the following new medications n/a     Leg Pain    History " "of Present Illness       Reason for visit:  Left calf pain    He eats 0-1 servings of fruits and vegetables daily.He consumes 1 sweetened beverage(s) daily.He exercises with enough effort to increase his heart rate 20 to 29 minutes per day.  He exercises with enough effort to increase his heart rate 4 days per week.   He is taking medications regularly.                   Objective    /76   Pulse 75   Temp 98.1  F (36.7  C) (Tympanic)   Resp 16   Ht 1.854 m (6' 1\")   Wt (!) 142.9 kg (315 lb)   SpO2 97%   BMI 41.56 kg/m    Body mass index is 41.56 kg/m .  Physical Exam   GENERAL: alert and no distress  EYES: Eyes grossly normal to inspection, PERRL and conjunctivae and sclerae normal  NECK: no adenopathy, no asymmetry, masses, or scars  RESP: lungs clear to auscultation - no rales, rhonchi or wheezes  CV: regular rate and rhythm, normal S1 S2, no S3 or S4, no murmur, click or rub, no peripheral edema   ABDOMEN: soft, nontender, no hepatosplenomegaly, no masses and bowel sounds normal  MS: no gross musculoskeletal defects noted, no edema  SKIN: no suspicious lesions or rashes  NEURO: Normal strength and tone, mentation intact and speech normal  PSYCH: mentation appears normal, affect normal/bright            Signed Electronically by: Sarthak Yu MD    "

## 2025-04-30 PROBLEM — M79.662 PAIN OF LEFT CALF: Status: RESOLVED | Noted: 2024-02-28 | Resolved: 2025-04-30

## 2025-05-07 DIAGNOSIS — I10 BENIGN ESSENTIAL HYPERTENSION: ICD-10-CM

## 2025-05-07 RX ORDER — LISINOPRIL AND HYDROCHLOROTHIAZIDE 10; 12.5 MG/1; MG/1
1 TABLET ORAL DAILY
Qty: 90 TABLET | Refills: 1 | OUTPATIENT
Start: 2025-05-07

## 2025-05-07 NOTE — TELEPHONE ENCOUNTER
Medication Question or Refill        What medication are you calling about (include dose and sig)?: Medication pended    Preferred Pharmacy:  [facility]/pharmacy #[id number] - [place], [place] - [address]  [address]   [place] [address]  Phone: [phone] Fax: [phone]      Controlled Substance Agreement on file:   CSA -- Patient Level:    CSA: None found at the patient level.       Who prescribed the medication?: Kalani Ramon in PCP    Do you need a refill? Yes    Could we send this information to you in [place] or would you prefer to receive a phone call?:   Patient would prefer a phone call   Okay to leave a detailed message?: Yes at Cell number on file:    Telephone Information:   Mobile [phone]